# Patient Record
Sex: FEMALE | Race: ASIAN | NOT HISPANIC OR LATINO
[De-identification: names, ages, dates, MRNs, and addresses within clinical notes are randomized per-mention and may not be internally consistent; named-entity substitution may affect disease eponyms.]

---

## 2017-10-24 ENCOUNTER — IMAGING SERVICES (OUTPATIENT)
Dept: OTHER | Age: 47
End: 2017-10-24

## 2018-06-08 ENCOUNTER — CHARTING TRANS (OUTPATIENT)
Dept: OTHER | Age: 48
End: 2018-06-08

## 2018-06-08 ENCOUNTER — IMAGING SERVICES (OUTPATIENT)
Dept: OTHER | Age: 48
End: 2018-06-08

## 2018-06-08 ENCOUNTER — LAB SERVICES (OUTPATIENT)
Dept: OTHER | Age: 48
End: 2018-06-08

## 2018-06-11 ENCOUNTER — CHARTING TRANS (OUTPATIENT)
Dept: OTHER | Age: 48
End: 2018-06-11

## 2018-06-12 ENCOUNTER — CHARTING TRANS (OUTPATIENT)
Dept: OTHER | Age: 48
End: 2018-06-12

## 2018-06-13 LAB — AP REPORT: NORMAL

## 2018-06-14 ENCOUNTER — CHARTING TRANS (OUTPATIENT)
Dept: OTHER | Age: 48
End: 2018-06-14

## 2018-09-24 ENCOUNTER — CHARTING TRANS (OUTPATIENT)
Dept: OTHER | Age: 48
End: 2018-09-24

## 2018-10-09 ENCOUNTER — ANCILLARY ORDERS (OUTPATIENT)
Dept: OTHER | Age: 48
End: 2018-10-09

## 2018-10-09 ENCOUNTER — CHARTING TRANS (OUTPATIENT)
Dept: OTHER | Age: 48
End: 2018-10-09

## 2018-10-09 DIAGNOSIS — N64.4 MASTODYNIA: ICD-10-CM

## 2019-03-05 VITALS
DIASTOLIC BLOOD PRESSURE: 60 MMHG | HEIGHT: 60 IN | TEMPERATURE: 95.4 F | SYSTOLIC BLOOD PRESSURE: 90 MMHG | WEIGHT: 111 LBS | HEART RATE: 68 BPM | BODY MASS INDEX: 21.79 KG/M2

## 2019-06-28 ENCOUNTER — OFFICE VISIT (OUTPATIENT)
Dept: INTERNAL MEDICINE | Age: 49
End: 2019-06-28

## 2019-06-28 VITALS
HEART RATE: 68 BPM | SYSTOLIC BLOOD PRESSURE: 100 MMHG | RESPIRATION RATE: 12 BRPM | TEMPERATURE: 98.1 F | WEIGHT: 113 LBS | HEIGHT: 61 IN | DIASTOLIC BLOOD PRESSURE: 60 MMHG | BODY MASS INDEX: 21.34 KG/M2

## 2019-06-28 DIAGNOSIS — R73.9 HYPERGLYCEMIA: ICD-10-CM

## 2019-06-28 DIAGNOSIS — Z00.00 ROUTINE GENERAL MEDICAL EXAMINATION AT HEALTH CARE FACILITY: Primary | ICD-10-CM

## 2019-06-28 DIAGNOSIS — Z12.11 SCREENING FOR COLON CANCER: ICD-10-CM

## 2019-06-28 PROCEDURE — 99396 PREV VISIT EST AGE 40-64: CPT | Performed by: INTERNAL MEDICINE

## 2019-06-28 RX ORDER — LORATADINE 10 MG/1
TABLET ORAL
COMMUNITY

## 2019-06-28 RX ORDER — MULTIVITAMIN,THER AND MINERALS
TABLET ORAL
COMMUNITY

## 2019-06-28 SDOH — HEALTH STABILITY: MENTAL HEALTH
STRESS IS WHEN SOMEONE FEELS TENSE, NERVOUS, ANXIOUS, OR CAN'T SLEEP AT NIGHT BECAUSE THEIR MIND IS TROUBLED. HOW STRESSED ARE YOU?: NOT AT ALL

## 2019-06-28 SDOH — HEALTH STABILITY: PHYSICAL HEALTH: ON AVERAGE, HOW MANY MINUTES DO YOU ENGAGE IN EXERCISE AT THIS LEVEL?: 30 MIN

## 2019-06-28 SDOH — HEALTH STABILITY: PHYSICAL HEALTH: ON AVERAGE, HOW MANY DAYS PER WEEK DO YOU ENGAGE IN MODERATE TO STRENUOUS EXERCISE (LIKE A BRISK WALK)?: 4 DAYS

## 2019-06-28 ASSESSMENT — PATIENT HEALTH QUESTIONNAIRE - PHQ9
1. LITTLE INTEREST OR PLEASURE IN DOING THINGS: NOT AT ALL
SUM OF ALL RESPONSES TO PHQ9 QUESTIONS 1 AND 2: 0
SUM OF ALL RESPONSES TO PHQ9 QUESTIONS 1 AND 2: 0
2. FEELING DOWN, DEPRESSED OR HOPELESS: NOT AT ALL

## 2019-06-29 ENCOUNTER — LAB SERVICES (OUTPATIENT)
Dept: LAB | Age: 49
End: 2019-06-29

## 2019-06-29 DIAGNOSIS — Z00.00 ROUTINE GENERAL MEDICAL EXAMINATION AT HEALTH CARE FACILITY: ICD-10-CM

## 2019-06-29 DIAGNOSIS — R73.9 HYPERGLYCEMIA: ICD-10-CM

## 2019-06-29 LAB
ALBUMIN SERPL BCG-MCNC: 4.5 G/DL (ref 3.6–5.1)
ALP SERPL-CCNC: 52 U/L (ref 45–130)
ALT SERPL W/O P-5'-P-CCNC: 10 U/L (ref 7–34)
AST SERPL-CCNC: 16 U/L (ref 9–37)
BILIRUB SERPL-MCNC: 0.4 MG/DL (ref 0–1)
BUN SERPL-MCNC: 14 MG/DL (ref 7–20)
CALCIUM SERPL-MCNC: 9.3 MG/DL (ref 8.6–10.6)
CHLORIDE SERPL-SCNC: 105 MMOL/L (ref 96–107)
CHOLEST SERPL-MCNC: 134 MG/DL (ref 140–200)
CREAT SERPL-MCNC: 0.7 MG/DL (ref 0.5–1.4)
DIFFERENTIAL TYPE: ABNORMAL
EST. AVERAGE GLUCOSE BLD GHB EST-MCNC: 104 MG/DL (ref 0–154)
GFR SERPL CREATININE-BSD FRML MDRD: >60 ML/MIN/{1.73M2}
GFR SERPL CREATININE-BSD FRML MDRD: >60 ML/MIN/{1.73M2}
GLUCOSE P FAST SERPL-MCNC: 88 MG/DL (ref 60–100)
HBA1C MFR BLD: 5.3 % (ref 4.2–6)
HCO3 SERPL-SCNC: 28 MMOL/L (ref 22–32)
HDLC SERPL-MCNC: 68 MG/DL
HEMATOCRIT: 38.5 % (ref 34–45)
HEMOGLOBIN: 12.2 G/DL (ref 11.2–15.7)
LDLC SERPL CALC-MCNC: 53 MG/DL (ref 0–100)
LYMPH PERCENT: 35 % (ref 20.5–51.1)
LYMPHOCYTE ABSOLUTE #: 1.2 10*3/UL (ref 1.2–3.4)
MEAN CORPUSCULAR HGB CONCENTRATION: 31.7 % (ref 32–36)
MEAN CORPUSCULAR HGB: 28.2 PG (ref 27–34)
MEAN CORPUSCULAR VOLUME: 88.9 FL (ref 79–95)
MEAN PLATELET VOLUME: 10.2 FL (ref 8.6–12.4)
MIXED %: 7.2 % (ref 4.3–12.9)
MIXED ABSOLUTE #: 0.2 10*3/UL (ref 0.2–0.9)
NEUTROPHIL ABSOLUTE #: 2 10*3/UL (ref 1.4–6.5)
NEUTROPHIL PERCENT: 57.8 % (ref 34–73.5)
PLATELET COUNT: 180 10*3/UL (ref 150–400)
POTASSIUM SERPL-SCNC: 3.9 MMOL/L (ref 3.5–5.3)
PROT SERPL-MCNC: 6.9 G/DL (ref 6.2–8.1)
RED BLOOD CELL COUNT: 4.33 10*6/UL (ref 3.7–5.2)
RED CELL DISTRIBUTION WIDTH: 13.4 % (ref 11.3–14.8)
SODIUM SERPL-SCNC: 141 MMOL/L (ref 136–146)
TRIGL SERPL-MCNC: 69 MG/DL (ref 0–200)
TSH SERPL DL<=0.05 MIU/L-ACNC: 2.68 M[IU]/L (ref 0.3–4.82)
WHITE BLOOD CELL COUNT: 3.4 10*3/UL (ref 4–10)

## 2019-06-29 PROCEDURE — 36415 COLL VENOUS BLD VENIPUNCTURE: CPT | Performed by: INTERNAL MEDICINE

## 2019-06-29 PROCEDURE — 80061 LIPID PANEL: CPT | Performed by: INTERNAL MEDICINE

## 2019-06-29 PROCEDURE — 80050 GENERAL HEALTH PANEL: CPT | Performed by: INTERNAL MEDICINE

## 2019-06-29 PROCEDURE — 83036 HEMOGLOBIN GLYCOSYLATED A1C: CPT | Performed by: INTERNAL MEDICINE

## 2019-10-21 DIAGNOSIS — Z12.31 VISIT FOR SCREENING MAMMOGRAM: Primary | ICD-10-CM

## 2019-11-11 ENCOUNTER — IMAGING SERVICES (OUTPATIENT)
Dept: MAMMOGRAPHY | Age: 49
End: 2019-11-11
Attending: INTERNAL MEDICINE

## 2019-11-11 DIAGNOSIS — Z12.31 VISIT FOR SCREENING MAMMOGRAM: ICD-10-CM

## 2019-11-11 PROCEDURE — 77063 BREAST TOMOSYNTHESIS BI: CPT | Performed by: RADIOLOGY

## 2019-11-11 PROCEDURE — 77067 SCR MAMMO BI INCL CAD: CPT | Performed by: RADIOLOGY

## 2019-11-12 DIAGNOSIS — R92.30 DENSE BREAST TISSUE ON MAMMOGRAM: Primary | ICD-10-CM

## 2019-11-16 ENCOUNTER — TELEPHONE (OUTPATIENT)
Dept: ULTRASOUND IMAGING | Age: 49
End: 2019-11-16

## 2019-12-21 ENCOUNTER — TELEPHONE (OUTPATIENT)
Dept: INTERNAL MEDICINE | Age: 49
End: 2019-12-21

## 2020-10-06 ENCOUNTER — WALK IN (OUTPATIENT)
Dept: URGENT CARE | Age: 50
End: 2020-10-06

## 2020-10-06 DIAGNOSIS — H60.12 CELLULITIS OF LEFT EXTERNAL EAR: Primary | ICD-10-CM

## 2020-10-06 PROCEDURE — 99214 OFFICE O/P EST MOD 30 MIN: CPT | Performed by: FAMILY MEDICINE

## 2020-10-06 RX ORDER — PREDNISONE 20 MG/1
40 TABLET ORAL DAILY
Qty: 10 TABLET | Refills: 0 | Status: SHIPPED | OUTPATIENT
Start: 2020-10-06 | End: 2020-10-06 | Stop reason: SDUPTHER

## 2020-10-06 RX ORDER — CEFADROXIL 500 MG/1
500 CAPSULE ORAL 2 TIMES DAILY
Qty: 20 CAPSULE | Refills: 0 | Status: SHIPPED | OUTPATIENT
Start: 2020-10-06 | End: 2020-10-16

## 2020-10-06 RX ORDER — CEFADROXIL 500 MG/1
500 CAPSULE ORAL 2 TIMES DAILY
Qty: 20 CAPSULE | Refills: 0 | Status: SHIPPED | OUTPATIENT
Start: 2020-10-06 | End: 2020-10-06 | Stop reason: SDUPTHER

## 2020-10-06 RX ORDER — PREDNISONE 20 MG/1
40 TABLET ORAL DAILY
Qty: 10 TABLET | Refills: 0 | Status: SHIPPED | OUTPATIENT
Start: 2020-10-06 | End: 2020-10-11

## 2020-10-06 ASSESSMENT — PAIN SCALES - GENERAL: PAINLEVEL: 7-8

## 2021-02-10 ENCOUNTER — LAB SERVICES (OUTPATIENT)
Dept: LAB | Age: 51
End: 2021-02-10

## 2021-02-10 ENCOUNTER — OFFICE VISIT (OUTPATIENT)
Dept: INTERNAL MEDICINE | Age: 51
End: 2021-02-10

## 2021-02-10 DIAGNOSIS — Z91.018 FOOD ALLERGY: ICD-10-CM

## 2021-02-10 DIAGNOSIS — Z12.31 ENCOUNTER FOR SCREENING MAMMOGRAM FOR MALIGNANT NEOPLASM OF BREAST: ICD-10-CM

## 2021-02-10 DIAGNOSIS — H60.11 CELLULITIS OF RIGHT EXTERNAL EAR: Primary | ICD-10-CM

## 2021-02-10 PROCEDURE — 36415 COLL VENOUS BLD VENIPUNCTURE: CPT | Performed by: INTERNAL MEDICINE

## 2021-02-10 PROCEDURE — 86003 ALLG SPEC IGE CRUDE XTRC EA: CPT | Performed by: INTERNAL MEDICINE

## 2021-02-10 PROCEDURE — 99214 OFFICE O/P EST MOD 30 MIN: CPT | Performed by: INTERNAL MEDICINE

## 2021-02-10 RX ORDER — CEPHALEXIN 500 MG/1
500 CAPSULE ORAL 2 TIMES DAILY
Qty: 20 CAPSULE | Refills: 0 | Status: SHIPPED | OUTPATIENT
Start: 2021-02-10 | End: 2021-02-20

## 2021-02-10 ASSESSMENT — PATIENT HEALTH QUESTIONNAIRE - PHQ9
CLINICAL INTERPRETATION OF PHQ9 SCORE: NO FURTHER SCREENING NEEDED
2. FEELING DOWN, DEPRESSED OR HOPELESS: NOT AT ALL
SUM OF ALL RESPONSES TO PHQ9 QUESTIONS 1 AND 2: 0
CLINICAL INTERPRETATION OF PHQ2 SCORE: NO FURTHER SCREENING NEEDED
SUM OF ALL RESPONSES TO PHQ9 QUESTIONS 1 AND 2: 0
1. LITTLE INTEREST OR PLEASURE IN DOING THINGS: NOT AT ALL

## 2021-02-10 ASSESSMENT — PAIN SCALES - GENERAL: PAINLEVEL: 7-8

## 2021-02-14 ENCOUNTER — TELEPHONE (OUTPATIENT)
Dept: INTERNAL MEDICINE | Age: 51
End: 2021-02-14

## 2021-02-17 ENCOUNTER — TELEPHONE (OUTPATIENT)
Dept: INTERNAL MEDICINE | Age: 51
End: 2021-02-17

## 2021-02-19 DIAGNOSIS — J30.5 ALLERGIC RHINITIS DUE TO FOOD: Primary | ICD-10-CM

## 2021-02-19 LAB
ALMOND IGE QN: 0.81 KU/L (ref 0–0.1)
BEEF IGE QN: 6.34 KU/L (ref 0–0.1)
BRAZIL NUT IGE QN: 0.28 KU/L (ref 0–0.1)
CASHEW NUT IGE QN: 0.81 KU/L (ref 0–0.1)
CHICKEN MEAT IGE QN: 16.6 KU/L (ref 0–0.1)
CLAM IGE QN: 0.24 KU/L (ref 0–0.1)
CODFISH IGE QN: <0.1 KU/L (ref 0–0.1)
CORN IGE QN: 0.83 KU/L (ref 0–0.1)
CRAB IGE QN: 20.6 KU/L (ref 0–0.1)
EGG WHITE IGE QN: 0.18 KU/L (ref 0–0.1)
EGG YOLK IGE QN: 0.26 KU/L (ref 0–0.1)
HAZELNUT IGE QN: 18.7 KU/L (ref 0–0.1)
LOBSTER IGE QN: 9.15 KU/L (ref 0–0.1)
MILK IGE QN: 0.26 KU/L (ref 0–0.1)
PEANUT IGE QN: 2.07 KU/L (ref 0–0.1)
PECAN/HICK NUT IGE QN: <0.1 KU/L (ref 0–0.1)
PISTACHIO IGE QN: 1.71 KU/L (ref 0–0.1)
PORK IGE QN: 0.52 KU/L (ref 0–0.1)
RICE IGE QN: 0.51 KU/L (ref 0–0.1)
SALMON IGE QN: 0.26 KU/L (ref 0–0.1)
SCAD IGE QN: 0.13 KU/L (ref 0–0.1)
SCALLOP IGE QN: 0.2 KU/L (ref 0–0.1)
SHRIMP IGE QN: 17.3 KU/L (ref 0–0.1)
SOYBEAN IGE QN: 1.4 KU/L (ref 0–0.1)
TUNA IGE QN: 11.4 KU/L (ref 0–0.1)
WALNUT IGE QN: 0.67 KU/L (ref 0–0.1)
WHEAT IGE QN: 1.1 KU/L (ref 0–0.1)

## 2021-02-22 ENCOUNTER — TELEPHONE (OUTPATIENT)
Dept: INTERNAL MEDICINE | Age: 51
End: 2021-02-22

## 2021-02-25 ENCOUNTER — TELEPHONE (OUTPATIENT)
Dept: INTERNAL MEDICINE | Age: 51
End: 2021-02-25

## 2021-03-01 ENCOUNTER — APPOINTMENT (OUTPATIENT)
Dept: ALLERGY | Age: 51
End: 2021-03-01

## 2021-05-25 VITALS
SYSTOLIC BLOOD PRESSURE: 98 MMHG | SYSTOLIC BLOOD PRESSURE: 101 MMHG | RESPIRATION RATE: 16 BRPM | BODY MASS INDEX: 21.6 KG/M2 | WEIGHT: 110 LBS | HEART RATE: 63 BPM | TEMPERATURE: 97.5 F | TEMPERATURE: 98.2 F | DIASTOLIC BLOOD PRESSURE: 60 MMHG | DIASTOLIC BLOOD PRESSURE: 56 MMHG | HEIGHT: 60 IN | OXYGEN SATURATION: 99 % | HEART RATE: 67 BPM | OXYGEN SATURATION: 99 % | RESPIRATION RATE: 16 BRPM

## 2021-06-23 ENCOUNTER — OFFICE VISIT (OUTPATIENT)
Dept: FAMILY MEDICINE CLINIC | Facility: CLINIC | Age: 51
End: 2021-06-23
Payer: COMMERCIAL

## 2021-06-23 ENCOUNTER — LAB ENCOUNTER (OUTPATIENT)
Dept: LAB | Age: 51
End: 2021-06-23
Attending: PHYSICIAN ASSISTANT
Payer: COMMERCIAL

## 2021-06-23 VITALS
TEMPERATURE: 98 F | RESPIRATION RATE: 21 BRPM | HEIGHT: 61 IN | OXYGEN SATURATION: 97 % | WEIGHT: 109.19 LBS | BODY MASS INDEX: 20.62 KG/M2 | DIASTOLIC BLOOD PRESSURE: 70 MMHG | HEART RATE: 66 BPM | SYSTOLIC BLOOD PRESSURE: 108 MMHG

## 2021-06-23 DIAGNOSIS — M25.50 MULTIPLE JOINT PAIN: ICD-10-CM

## 2021-06-23 DIAGNOSIS — R01.1 SYSTOLIC MURMUR: ICD-10-CM

## 2021-06-23 DIAGNOSIS — Z12.31 VISIT FOR SCREENING MAMMOGRAM: ICD-10-CM

## 2021-06-23 DIAGNOSIS — Z88.9 MULTIPLE ALLERGIES: ICD-10-CM

## 2021-06-23 DIAGNOSIS — Z12.11 COLON CANCER SCREENING: ICD-10-CM

## 2021-06-23 DIAGNOSIS — M94.1 RELAPSING POLYCHONDRITIS: ICD-10-CM

## 2021-06-23 DIAGNOSIS — Z00.00 ROUTINE GENERAL MEDICAL EXAMINATION AT A HEALTH CARE FACILITY: Primary | ICD-10-CM

## 2021-06-23 PROCEDURE — 99386 PREV VISIT NEW AGE 40-64: CPT | Performed by: PHYSICIAN ASSISTANT

## 2021-06-23 PROCEDURE — 3008F BODY MASS INDEX DOCD: CPT | Performed by: PHYSICIAN ASSISTANT

## 2021-06-23 PROCEDURE — 3074F SYST BP LT 130 MM HG: CPT | Performed by: PHYSICIAN ASSISTANT

## 2021-06-23 PROCEDURE — 3078F DIAST BP <80 MM HG: CPT | Performed by: PHYSICIAN ASSISTANT

## 2021-06-23 RX ORDER — CHLORAL HYDRATE 500 MG
CAPSULE ORAL
COMMUNITY

## 2021-06-23 NOTE — PROGRESS NOTES
HPI/Subjective:   Patient ID: Carlota Smith is a 46year old female. HPI   Patient presents today to establish care and requests physical exam. Was previously receiving care at [facility] but her PCP retired.       PMHx: generally healthy  PSHx: none  FHx: revi Psychiatric/Behavioral: Negative for decreased concentration and sleep disturbance. The patient is not nervous/anxious. Current Outpatient Medications   Medication Sig Dispense Refill   • omega-3 fatty acids 1000 MG Oral Cap by Does not apply route. Musculoskeletal:         General: No tenderness. Normal range of motion. Cervical back: Normal range of motion and neck supple. Lymphadenopathy:      Cervical: No cervical adenopathy. Skin:     General: Skin is warm and dry.       Capillary Refil Relapsing polychondritis  Currently asymptomatic but we will check labs to assess for potential etiologies and follow-up pending results. - PERDITO, DIRECT, REFLEX TO 9 ENAS; Future  - RHEUMATOID ARTHRITIS FACTOR; Future  - SED RATE, LAINE (AUTOMATED);  Pasha Sánchez

## 2021-06-25 ENCOUNTER — LAB ENCOUNTER (OUTPATIENT)
Dept: LAB | Age: 51
End: 2021-06-25
Attending: PHYSICIAN ASSISTANT
Payer: COMMERCIAL

## 2021-06-25 DIAGNOSIS — Z00.00 ROUTINE GENERAL MEDICAL EXAMINATION AT A HEALTH CARE FACILITY: ICD-10-CM

## 2021-06-25 DIAGNOSIS — M94.1 RELAPSING POLYCHONDRITIS: ICD-10-CM

## 2021-06-25 DIAGNOSIS — Z88.9 MULTIPLE ALLERGIES: ICD-10-CM

## 2021-06-25 DIAGNOSIS — M25.50 MULTIPLE JOINT PAIN: ICD-10-CM

## 2021-06-25 PROCEDURE — 80050 GENERAL HEALTH PANEL: CPT | Performed by: PHYSICIAN ASSISTANT

## 2021-06-25 PROCEDURE — 82306 VITAMIN D 25 HYDROXY: CPT | Performed by: PHYSICIAN ASSISTANT

## 2021-06-25 PROCEDURE — 82607 VITAMIN B-12: CPT | Performed by: PHYSICIAN ASSISTANT

## 2021-06-25 PROCEDURE — 86225 DNA ANTIBODY NATIVE: CPT | Performed by: PHYSICIAN ASSISTANT

## 2021-06-25 PROCEDURE — 86235 NUCLEAR ANTIGEN ANTIBODY: CPT | Performed by: PHYSICIAN ASSISTANT

## 2021-06-25 PROCEDURE — 86003 ALLG SPEC IGE CRUDE XTRC EA: CPT | Performed by: PHYSICIAN ASSISTANT

## 2021-06-25 PROCEDURE — 86140 C-REACTIVE PROTEIN: CPT | Performed by: PHYSICIAN ASSISTANT

## 2021-06-25 PROCEDURE — 85652 RBC SED RATE AUTOMATED: CPT | Performed by: PHYSICIAN ASSISTANT

## 2021-06-25 PROCEDURE — 86431 RHEUMATOID FACTOR QUANT: CPT | Performed by: PHYSICIAN ASSISTANT

## 2021-06-25 PROCEDURE — 80061 LIPID PANEL: CPT | Performed by: PHYSICIAN ASSISTANT

## 2021-06-25 PROCEDURE — 86038 ANTINUCLEAR ANTIBODIES: CPT | Performed by: PHYSICIAN ASSISTANT

## 2021-06-25 PROCEDURE — 82785 ASSAY OF IGE: CPT | Performed by: PHYSICIAN ASSISTANT

## 2021-07-02 LAB
ANA SCREEN: POSITIVE
CENTROMERE AUTOAB: <100 AU/ML (ref ?–100)
DSDNA AUTOAB: 394 IU/ML (ref ?–100)
HISTONE AUTOAB: 272 AU/ML (ref ?–100)
JO-1 AUTOAB: <100 AU/ML (ref ?–100)
RNP AUTOAB: <100 AU/ML (ref ?–100)
SCL-70 AUTOAB: <100 AU/ML (ref ?–100)
SM AUTOAB (SMITH): <100 AU/ML (ref ?–100)
SSA AUTOAB: 424 AU/ML (ref ?–100)
SSB AUTOAB: 112 AU/ML (ref ?–100)

## 2021-07-23 ENCOUNTER — TELEPHONE (OUTPATIENT)
Dept: FAMILY MEDICINE CLINIC | Facility: CLINIC | Age: 51
End: 2021-07-23

## 2021-07-23 NOTE — TELEPHONE ENCOUNTER
Patient was seen at end of June and had labs done. She has not heard back on results. Informed patient that results and comments were sent to her MyChart. However she would like a nurse to give her a call. Please advise.

## 2021-07-23 NOTE — TELEPHONE ENCOUNTER
Thank you for bringing this to my attention. The PEDRITO is abnormal. There are elevated levels of SSA/SSB antibodies which are seen in Sjogren's disease. Also elevated histone and double-stranded DNA which are seen in lupus.   I recommend she follow-up with D

## 2021-07-23 NOTE — TELEPHONE ENCOUNTER
Patient notified, verbalized understanding, allergist phone number provided. Will mail hard copy of labs to patient home, address verified with patient. Patient has appt with Dr. Yuniel Bradford 9/2.

## 2021-08-18 ENCOUNTER — LAB ENCOUNTER (OUTPATIENT)
Dept: LAB | Facility: HOSPITAL | Age: 51
End: 2021-08-18
Payer: COMMERCIAL

## 2021-08-18 DIAGNOSIS — Z12.11 COLON CANCER SCREENING: ICD-10-CM

## 2021-08-18 PROCEDURE — 82274 ASSAY TEST FOR BLOOD FECAL: CPT

## 2021-08-24 LAB — HEMOCCULT STL QL: NEGATIVE

## 2021-09-02 ENCOUNTER — OFFICE VISIT (OUTPATIENT)
Dept: RHEUMATOLOGY | Facility: CLINIC | Age: 51
End: 2021-09-02
Payer: COMMERCIAL

## 2021-09-02 VITALS
HEART RATE: 66 BPM | SYSTOLIC BLOOD PRESSURE: 105 MMHG | WEIGHT: 107 LBS | OXYGEN SATURATION: 99 % | DIASTOLIC BLOOD PRESSURE: 60 MMHG | TEMPERATURE: 98 F | HEIGHT: 61 IN | BODY MASS INDEX: 20.2 KG/M2

## 2021-09-02 DIAGNOSIS — Z11.1 SCREENING FOR TUBERCULOSIS: ICD-10-CM

## 2021-09-02 DIAGNOSIS — Z11.52 ENCOUNTER FOR SCREENING FOR COVID-19: ICD-10-CM

## 2021-09-02 DIAGNOSIS — R76.8 POSITIVE ANA (ANTINUCLEAR ANTIBODY): ICD-10-CM

## 2021-09-02 DIAGNOSIS — Z11.59 NEED FOR HEPATITIS B SCREENING TEST: ICD-10-CM

## 2021-09-02 DIAGNOSIS — Z11.59 NEED FOR HEPATITIS C SCREENING TEST: ICD-10-CM

## 2021-09-02 DIAGNOSIS — M32.9 SYSTEMIC LUPUS ERYTHEMATOSUS, UNSPECIFIED SLE TYPE, UNSPECIFIED ORGAN INVOLVEMENT STATUS (HCC): Primary | ICD-10-CM

## 2021-09-02 DIAGNOSIS — Z51.81 THERAPEUTIC DRUG MONITORING: ICD-10-CM

## 2021-09-02 DIAGNOSIS — M94.1 RELAPSING POLYCHONDRITIS: ICD-10-CM

## 2021-09-02 PROCEDURE — 99245 OFF/OP CONSLTJ NEW/EST HI 55: CPT | Performed by: INTERNAL MEDICINE

## 2021-09-02 PROCEDURE — 3074F SYST BP LT 130 MM HG: CPT | Performed by: INTERNAL MEDICINE

## 2021-09-02 PROCEDURE — 3078F DIAST BP <80 MM HG: CPT | Performed by: INTERNAL MEDICINE

## 2021-09-02 PROCEDURE — 3008F BODY MASS INDEX DOCD: CPT | Performed by: INTERNAL MEDICINE

## 2021-09-02 RX ORDER — HYDROXYCHLOROQUINE SULFATE 200 MG/1
TABLET, FILM COATED ORAL
Qty: 135 TABLET | Refills: 1 | Status: SHIPPED | OUTPATIENT
Start: 2021-09-02

## 2021-09-02 RX ORDER — PREDNISONE 1 MG/1
TABLET ORAL
Qty: 28 TABLET | Refills: 0 | Status: SHIPPED | OUTPATIENT
Start: 2021-09-02 | End: 2021-09-23

## 2021-09-02 NOTE — PATIENT INSTRUCTIONS
Get bloodwork. Pulmonary function test. Call THE [facility] scheduling line to set the appointment up. Phone number is [phone].       Hydroxychloroquine (plaquenil) start: Take 1 tab daily for 2 weeks, and if tolerating, then increase to 1.5 tabs alta

## 2021-09-02 NOTE — PROGRESS NOTES
Rheumatology New Patient Note  =====================================================================================================      Date of visit: 9/2/2021  ?   Patient presents with:  Establish Care: New pt, referred by Vidya Woodson for abn labs Tab, Take 2 tablets (10 mg total) by mouth daily for 7 days, THEN 1 tablet (5 mg total) daily for 14 days. , Disp: 28 tablet, Rfl: 0  Hydroxychloroquine Sulfate 200 MG Oral Tab, Take 1 and 1/2 tabs each day, Disp: 135 tablet, Rfl: 1      Modified Medication erythema, or increased warmth. Elbows- No swelling, erythema, or increased warmth. Shoulders- FROM, abduction ~180 degrees bilaterally. Knees- No swelling, erythema, or increased warmth. AROM flexion/extension ~0-180 degrees.     No valgus/varus laxi [id number], [id number]  No results found for: CARDIOLIPIGG, CARDIOLIPIGM, CARDIOLIPIGA, CARDIOIGA, CARLIP      Additional Labs:  2021 labs  PEDRITO positive    SSB: 112  WBC 2.8, hemoglobin 12.2, platelets 642  Vitamin D 34.2    Radiology:    4/2021 MRI scleritis/keratitis/uveitis. Today the patient is feeling well, perhaps some residual pinna inflammation and some mild intermittent tinnitus. After changing diet to more plant-based, patient is feeling much better.   Extensive discussion regarding natur DIFFERENTIAL WITH PLATELET; Future  -     COMP METABOLIC PANEL (14); Future  -     COMPLEMENT C3, SERUM; Future  -     COMPLEMENT C4, SERUM; Future  -     C-REACTIVE PROTEIN; Future  -     SED RATE, WESTERGREN (AUTOMATED);  Future  -     URINALYSIS WITH CUL daily for 7 days, THEN 1 tablet (5 mg total) daily for 14 days. Need for hepatitis B screening test  -     HEPATITIS B SURFACE ANTIGEN; Future  -     HEP B CORE AB, TOT; Future  -     HEPATITIS B SURFACE ANTIBODY;  Future    Need for hepatitis C screenin plan of care were answered. Thank you for referring this delightful patient to me. Please feel free to contact me with any questions. This report was performed utilizing speech recognition software technology.  Despite proofreading, speech recognitio

## 2021-11-01 ENCOUNTER — LAB ENCOUNTER (OUTPATIENT)
Dept: LAB | Age: 51
End: 2021-11-01
Attending: INTERNAL MEDICINE
Payer: COMMERCIAL

## 2021-11-01 DIAGNOSIS — R76.8 POSITIVE ANA (ANTINUCLEAR ANTIBODY): ICD-10-CM

## 2021-11-01 DIAGNOSIS — Z11.59 NEED FOR HEPATITIS C SCREENING TEST: ICD-10-CM

## 2021-11-01 DIAGNOSIS — Z51.81 THERAPEUTIC DRUG MONITORING: ICD-10-CM

## 2021-11-01 DIAGNOSIS — M32.9 SYSTEMIC LUPUS ERYTHEMATOSUS, UNSPECIFIED SLE TYPE, UNSPECIFIED ORGAN INVOLVEMENT STATUS (HCC): ICD-10-CM

## 2021-11-01 DIAGNOSIS — Z11.59 NEED FOR HEPATITIS B SCREENING TEST: ICD-10-CM

## 2021-11-01 PROCEDURE — 83883 ASSAY NEPHELOMETRY NOT SPEC: CPT | Performed by: INTERNAL MEDICINE

## 2021-11-01 PROCEDURE — 86146 BETA-2 GLYCOPROTEIN ANTIBODY: CPT | Performed by: INTERNAL MEDICINE

## 2021-11-01 PROCEDURE — 86706 HEP B SURFACE ANTIBODY: CPT | Performed by: INTERNAL MEDICINE

## 2021-11-01 PROCEDURE — 87340 HEPATITIS B SURFACE AG IA: CPT | Performed by: INTERNAL MEDICINE

## 2021-11-01 PROCEDURE — 86147 CARDIOLIPIN ANTIBODY EA IG: CPT | Performed by: INTERNAL MEDICINE

## 2021-11-01 PROCEDURE — 83516 IMMUNOASSAY NONANTIBODY: CPT | Performed by: INTERNAL MEDICINE

## 2021-11-01 PROCEDURE — 86803 HEPATITIS C AB TEST: CPT | Performed by: INTERNAL MEDICINE

## 2021-11-01 PROCEDURE — 85732 THROMBOPLASTIN TIME PARTIAL: CPT | Performed by: INTERNAL MEDICINE

## 2021-11-01 PROCEDURE — 84156 ASSAY OF PROTEIN URINE: CPT | Performed by: INTERNAL MEDICINE

## 2021-11-01 PROCEDURE — 86480 TB TEST CELL IMMUN MEASURE: CPT | Performed by: INTERNAL MEDICINE

## 2021-11-01 PROCEDURE — 86704 HEP B CORE ANTIBODY TOTAL: CPT | Performed by: INTERNAL MEDICINE

## 2021-11-01 PROCEDURE — 85705 THROMBOPLASTIN INHIBITION: CPT | Performed by: INTERNAL MEDICINE

## 2021-11-01 PROCEDURE — 83876 ASSAY MYELOPEROXIDASE: CPT | Performed by: INTERNAL MEDICINE

## 2021-11-01 PROCEDURE — 85652 RBC SED RATE AUTOMATED: CPT | Performed by: INTERNAL MEDICINE

## 2021-11-01 PROCEDURE — 86255 FLUORESCENT ANTIBODY SCREEN: CPT | Performed by: INTERNAL MEDICINE

## 2021-11-01 PROCEDURE — 85610 PROTHROMBIN TIME: CPT | Performed by: INTERNAL MEDICINE

## 2021-11-01 PROCEDURE — 84165 PROTEIN E-PHORESIS SERUM: CPT | Performed by: INTERNAL MEDICINE

## 2021-11-01 PROCEDURE — 86200 CCP ANTIBODY: CPT | Performed by: INTERNAL MEDICINE

## 2021-11-01 PROCEDURE — 86160 COMPLEMENT ANTIGEN: CPT | Performed by: INTERNAL MEDICINE

## 2021-11-01 PROCEDURE — 85025 COMPLETE CBC W/AUTO DIFF WBC: CPT | Performed by: INTERNAL MEDICINE

## 2021-11-01 PROCEDURE — 86334 IMMUNOFIX E-PHORESIS SERUM: CPT | Performed by: INTERNAL MEDICINE

## 2021-11-01 PROCEDURE — 80053 COMPREHEN METABOLIC PANEL: CPT | Performed by: INTERNAL MEDICINE

## 2021-11-01 PROCEDURE — 86140 C-REACTIVE PROTEIN: CPT | Performed by: INTERNAL MEDICINE

## 2021-11-01 PROCEDURE — 82570 ASSAY OF URINE CREATININE: CPT | Performed by: INTERNAL MEDICINE

## 2021-11-01 PROCEDURE — 87086 URINE CULTURE/COLONY COUNT: CPT | Performed by: INTERNAL MEDICINE

## 2021-11-01 PROCEDURE — 81001 URINALYSIS AUTO W/SCOPE: CPT | Performed by: INTERNAL MEDICINE

## 2021-11-01 PROCEDURE — 86431 RHEUMATOID FACTOR QUANT: CPT | Performed by: INTERNAL MEDICINE

## 2021-11-01 PROCEDURE — 86038 ANTINUCLEAR ANTIBODIES: CPT | Performed by: INTERNAL MEDICINE

## 2021-11-01 PROCEDURE — 85613 RUSSELL VIPER VENOM DILUTED: CPT | Performed by: INTERNAL MEDICINE

## 2021-11-04 ENCOUNTER — TELEPHONE (OUTPATIENT)
Dept: RHEUMATOLOGY | Facility: CLINIC | Age: 51
End: 2021-11-04

## 2021-11-04 ENCOUNTER — OFFICE VISIT (OUTPATIENT)
Dept: RHEUMATOLOGY | Facility: CLINIC | Age: 51
End: 2021-11-04
Payer: COMMERCIAL

## 2021-11-04 VITALS
OXYGEN SATURATION: 99 % | HEART RATE: 79 BPM | BODY MASS INDEX: 21.4 KG/M2 | HEIGHT: 60 IN | WEIGHT: 109 LBS | DIASTOLIC BLOOD PRESSURE: 72 MMHG | SYSTOLIC BLOOD PRESSURE: 106 MMHG

## 2021-11-04 DIAGNOSIS — M94.1 RELAPSING POLYCHONDRITIS: ICD-10-CM

## 2021-11-04 DIAGNOSIS — R06.02 SOB (SHORTNESS OF BREATH): ICD-10-CM

## 2021-11-04 DIAGNOSIS — R76.8 ABNORMAL ANCA TEST: ICD-10-CM

## 2021-11-04 DIAGNOSIS — M32.9 SYSTEMIC LUPUS ERYTHEMATOSUS, UNSPECIFIED SLE TYPE, UNSPECIFIED ORGAN INVOLVEMENT STATUS (HCC): Primary | ICD-10-CM

## 2021-11-04 PROCEDURE — 3008F BODY MASS INDEX DOCD: CPT | Performed by: INTERNAL MEDICINE

## 2021-11-04 PROCEDURE — 3078F DIAST BP <80 MM HG: CPT | Performed by: INTERNAL MEDICINE

## 2021-11-04 PROCEDURE — 3074F SYST BP LT 130 MM HG: CPT | Performed by: INTERNAL MEDICINE

## 2021-11-04 PROCEDURE — 99215 OFFICE O/P EST HI 40 MIN: CPT | Performed by: INTERNAL MEDICINE

## 2021-11-04 NOTE — PATIENT INSTRUCTIONS
Hydroxychloroquine (plaquenil) start: Take 1 tab daily. A list of medications or exposures that can flare lupus. :    -UV light  -Bactrim or other sulfa medications,  -Echinacea  -Alfalfa sprouts  -CoQ-10  -Melatonin  -Garlic    ====================== pesticides and chemical contaminants and are better if it fit your budget  If not make sure you wash produce really well before eating or cooking   Avoid using plastic utensils to store wet food or heat food in microwave

## 2021-11-04 NOTE — TELEPHONE ENCOUNTER
Called to initiate PA for imaging; CT Chest Hi Resolution; rerouted to [facility] [phone] ([facility]):  PA required; clinicals answered during this call; [facility] #[id number], Effective 11/4/21 - 12/3/21.     Called pt to update; lm on

## 2021-11-04 NOTE — TELEPHONE ENCOUNTER
Pt returned my call. Updated on PA approval for CT. Is driving currently but will call if she does not have CS phone #.

## 2021-11-04 NOTE — PROGRESS NOTES
Rheumatology f/u Patient Note  =====================================================================================================    ? Patient presents with: Follow - Up: 2mo f/u, feeling better since last visit.  Did not start hydroxychloroquine d clots.    ==============================================================================================================  Today's Visit: 11/04/21    Feeling much better after prednisone taper over 3 weeks. Stiffness in hands much better.  Talked to sister Strength, CN2-12 grossly intact   Psych: normal affect. Skin: No lesions or rashes. MSK: 28 joint count performed. No evidence of synovitis in mcp, pip, dip, wrist, elbows, shoulders, hips, knees, ankles, mtp unless otherwise noted.  Full ROM of elbows, 11/01/2021    [id number] <2.9 11/01/2021     Lab Results   Component Value Date    CARDIOLIPIGG 4.2 11/01/2021    CARDIOLIPIGM 4.1 11/01/2021         Additional Labs:    11/2021  C3 71.4, C4 19  CRP less than 0.29  Sed rate 39   lupus anticoagulant, antica well.  She took low-dose prednisone taper (10 mg -> 5 mg over 3 weeks) since last clinic visit which improved her hand arthralgia and mild residual pinna inflammation. After changing diet to more plant-based.     There still remains some mild inflammatory COMP METABOLIC PANEL (14); Future  -     COMPLEMENT C3, SERUM; Future  -     COMPLEMENT C4, SERUM; Future  -     CBC WITH DIFFERENTIAL WITH PLATELET; Future  -     SED RATE, WESTERGREN (AUTOMATED);  Future  -     C-REACTIVE PROTEIN; Future  -     URINALYSI

## 2021-11-05 NOTE — PROGRESS NOTES
Dear Noel Ling    Another test is positive that can be seen related to lupus or certain other inflammation conditions. It will be important to get the CT scan to make sure there is no inflammation in the lungs.      I hope you are well,    Art Brewster MD

## 2021-11-16 ENCOUNTER — PATIENT MESSAGE (OUTPATIENT)
Dept: FAMILY MEDICINE CLINIC | Facility: CLINIC | Age: 51
End: 2021-11-16

## 2021-11-16 NOTE — TELEPHONE ENCOUNTER
From: Stanley Pendleton  To: Jonathan Azul PA-C  Sent: 11/16/2021 10:54 AM CST  Subject: COVID booster shot    I have Swati Azael and am looking to get booster shot, where can I get it?   Thanks,   Pedro Hagen

## 2021-11-19 ENCOUNTER — HOSPITAL ENCOUNTER (OUTPATIENT)
Dept: CT IMAGING | Age: 51
Discharge: HOME | End: 2021-11-19
Attending: INTERNAL MEDICINE
Payer: COMMERCIAL

## 2021-11-19 DIAGNOSIS — R76.8 ABNORMAL ANCA TEST: ICD-10-CM

## 2021-11-19 DIAGNOSIS — R06.02 SOB (SHORTNESS OF BREATH): ICD-10-CM

## 2021-11-19 PROCEDURE — 71250 CT THORAX DX C-: CPT | Performed by: INTERNAL MEDICINE

## 2021-11-19 NOTE — PROGRESS NOTES
Can you call radiology for Dr. Sis Judge to comment on if there is any tracheitis or tracheal stenosis. Patient with history of relapsing polychondritis and want to rule this out.

## 2021-11-20 NOTE — PROGRESS NOTES
Dear Becca Loveless news, no evidence of lung inflammation. The following findings are not significant and are likely scars related to a prior mold or viral or bacterial pneumonia.      2. There is mild bilateral apical pleural and parenchymal scarr

## 2021-11-23 NOTE — PROGRESS NOTES
ADDENDUM:  There is no CT evidence for tracheitis or tracheal stenosis. This is good news. No inflammation in trachea.

## 2021-11-26 ENCOUNTER — HOSPITAL ENCOUNTER (OUTPATIENT)
Dept: MAMMOGRAPHY | Age: 51
Discharge: HOME | End: 2021-11-26
Attending: PHYSICIAN ASSISTANT
Payer: COMMERCIAL

## 2021-11-26 DIAGNOSIS — Z12.31 VISIT FOR SCREENING MAMMOGRAM: ICD-10-CM

## 2021-12-29 ENCOUNTER — HOSPITAL ENCOUNTER (OUTPATIENT)
Dept: MAMMOGRAPHY | Age: 51
Discharge: HOME | End: 2021-12-29
Attending: PHYSICIAN ASSISTANT
Payer: COMMERCIAL

## 2021-12-29 PROCEDURE — 77063 BREAST TOMOSYNTHESIS BI: CPT | Performed by: PHYSICIAN ASSISTANT

## 2021-12-29 PROCEDURE — 77067 SCR MAMMO BI INCL CAD: CPT | Performed by: PHYSICIAN ASSISTANT

## 2022-08-30 ENCOUNTER — TELEPHONE (OUTPATIENT)
Dept: CASE MANAGEMENT | Age: 52
End: 2022-08-30

## 2023-05-11 ENCOUNTER — OFFICE VISIT (OUTPATIENT)
Dept: FAMILY MEDICINE CLINIC | Facility: CLINIC | Age: 53
End: 2023-05-11
Payer: COMMERCIAL

## 2023-05-11 VITALS
RESPIRATION RATE: 18 BRPM | BODY MASS INDEX: 21.2 KG/M2 | OXYGEN SATURATION: 98 % | TEMPERATURE: 98 F | HEIGHT: 60 IN | HEART RATE: 76 BPM | WEIGHT: 108 LBS

## 2023-05-11 DIAGNOSIS — Z48.02 VISIT FOR SUTURE REMOVAL: Primary | ICD-10-CM

## 2023-05-11 PROCEDURE — 99215 OFFICE O/P EST HI 40 MIN: CPT | Performed by: PHYSICIAN ASSISTANT

## 2023-05-11 PROCEDURE — 3008F BODY MASS INDEX DOCD: CPT | Performed by: PHYSICIAN ASSISTANT

## 2023-06-18 ENCOUNTER — OFFICE VISIT (OUTPATIENT)
Dept: FAMILY MEDICINE CLINIC | Facility: CLINIC | Age: 53
End: 2023-06-18
Payer: COMMERCIAL

## 2023-06-18 VITALS
RESPIRATION RATE: 18 BRPM | BODY MASS INDEX: 20.62 KG/M2 | SYSTOLIC BLOOD PRESSURE: 114 MMHG | DIASTOLIC BLOOD PRESSURE: 66 MMHG | HEART RATE: 76 BPM | WEIGHT: 105 LBS | OXYGEN SATURATION: 99 % | TEMPERATURE: 98 F | HEIGHT: 60 IN

## 2023-06-18 DIAGNOSIS — B02.9 HERPES ZOSTER WITHOUT COMPLICATION: Primary | ICD-10-CM

## 2023-06-18 PROCEDURE — 3078F DIAST BP <80 MM HG: CPT | Performed by: NURSE PRACTITIONER

## 2023-06-18 PROCEDURE — 3008F BODY MASS INDEX DOCD: CPT | Performed by: NURSE PRACTITIONER

## 2023-06-18 PROCEDURE — 3074F SYST BP LT 130 MM HG: CPT | Performed by: NURSE PRACTITIONER

## 2023-06-18 PROCEDURE — 99213 OFFICE O/P EST LOW 20 MIN: CPT | Performed by: NURSE PRACTITIONER

## 2023-06-18 RX ORDER — VALACYCLOVIR HYDROCHLORIDE 1 G/1
1000 TABLET, FILM COATED ORAL 3 TIMES DAILY
Qty: 30 TABLET | Refills: 0 | Status: SHIPPED | OUTPATIENT
Start: 2023-06-18 | End: 2023-06-28

## 2023-06-18 NOTE — PATIENT INSTRUCTIONS
Take all of your medicines as instructed. Keep your rash clean and dry. Do not use creams or gels unless your doctor or nurse says that you should. Try not to scratch your skin. It might help to cover it with a clean dressing. Wear loose clothing if this makes you more comfortable.

## 2023-07-12 ENCOUNTER — OFFICE VISIT (OUTPATIENT)
Dept: FAMILY MEDICINE CLINIC | Facility: CLINIC | Age: 53
End: 2023-07-12
Payer: COMMERCIAL

## 2023-07-12 VITALS
BODY MASS INDEX: 21.01 KG/M2 | WEIGHT: 107 LBS | SYSTOLIC BLOOD PRESSURE: 112 MMHG | HEIGHT: 60 IN | HEART RATE: 78 BPM | RESPIRATION RATE: 18 BRPM | DIASTOLIC BLOOD PRESSURE: 68 MMHG | OXYGEN SATURATION: 99 % | TEMPERATURE: 98 F

## 2023-07-12 DIAGNOSIS — M32.9 SYSTEMIC LUPUS ERYTHEMATOSUS, UNSPECIFIED SLE TYPE, UNSPECIFIED ORGAN INVOLVEMENT STATUS (HCC): ICD-10-CM

## 2023-07-12 DIAGNOSIS — Z12.31 VISIT FOR SCREENING MAMMOGRAM: ICD-10-CM

## 2023-07-12 DIAGNOSIS — Z12.4 SCREENING FOR CERVICAL CANCER: ICD-10-CM

## 2023-07-12 DIAGNOSIS — Z13.820 ENCOUNTER FOR OSTEOPOROSIS SCREENING IN ASYMPTOMATIC POSTMENOPAUSAL PATIENT: ICD-10-CM

## 2023-07-12 DIAGNOSIS — Z78.0 ENCOUNTER FOR OSTEOPOROSIS SCREENING IN ASYMPTOMATIC POSTMENOPAUSAL PATIENT: ICD-10-CM

## 2023-07-12 DIAGNOSIS — Z00.00 ROUTINE GENERAL MEDICAL EXAMINATION AT A HEALTH CARE FACILITY: Primary | ICD-10-CM

## 2023-07-12 DIAGNOSIS — Z12.11 COLON CANCER SCREENING: ICD-10-CM

## 2023-07-12 PROBLEM — H04.123 DRY EYE SYNDROME OF BILATERAL LACRIMAL GLANDS: Status: ACTIVE | Noted: 2021-07-03

## 2023-07-12 PROCEDURE — 87624 HPV HI-RISK TYP POOLED RSLT: CPT | Performed by: PHYSICIAN ASSISTANT

## 2023-07-12 PROCEDURE — 3074F SYST BP LT 130 MM HG: CPT | Performed by: PHYSICIAN ASSISTANT

## 2023-07-12 PROCEDURE — 99396 PREV VISIT EST AGE 40-64: CPT | Performed by: PHYSICIAN ASSISTANT

## 2023-07-12 PROCEDURE — 3078F DIAST BP <80 MM HG: CPT | Performed by: PHYSICIAN ASSISTANT

## 2023-07-12 PROCEDURE — 3008F BODY MASS INDEX DOCD: CPT | Performed by: PHYSICIAN ASSISTANT

## 2023-07-12 NOTE — PROGRESS NOTES
Subjective:   Patient ID: Janessa Kohler is a 52year old female. HPI  Patient presents today for physical.      Diet: well balanced  Exercise: regular activity  Tobacco use: denies  Drug use: denies  Alcohol use: denies  Sexually active: with spouse  LMP: postmenopausal   Marital status: , 2 children  Occupation: analyst     Health maintenance:  Pap/Hpv: 9/23/22 negative  Mammogram: 12/29/21 benign  Performs SBEs: yes  Dexa: never  Colonoscopy: never   Discussed age appropriate vaccines    Still having some nerve pain from shingles      History/Other:   Review of Systems   Constitutional:  Negative for chills, fatigue and fever. HENT:  Negative for congestion, ear pain, rhinorrhea and sore throat. Eyes:  Negative for visual disturbance. Respiratory:  Negative for cough, shortness of breath and wheezing. Cardiovascular:  Negative for chest pain, palpitations and leg swelling. Gastrointestinal:  Negative for abdominal pain, diarrhea, nausea and vomiting. Genitourinary:  Negative for dysuria, frequency and hematuria. Musculoskeletal:  Negative for arthralgias, gait problem and myalgias. Skin:  Negative for rash. Neurological:  Positive for numbness (nerve pain associated with shingles). Negative for weakness, light-headedness and headaches. Hematological:  Negative for adenopathy. Psychiatric/Behavioral:  The patient is not nervous/anxious. No current outpatient medications on file. Allergies:Not on File    Objective:   Physical Exam  Vitals and nursing note reviewed. Constitutional:       Appearance: She is well-developed. HENT:      Head: Normocephalic and atraumatic. Right Ear: Tympanic membrane and external ear normal.      Left Ear: Tympanic membrane and external ear normal.      Nose: Nose normal.   Eyes:      Conjunctiva/sclera: Conjunctivae normal.      Pupils: Pupils are equal, round, and reactive to light. Neck:      Thyroid: No thyromegaly.    Cardiovascular: Rate and Rhythm: Normal rate and regular rhythm. Heart sounds: Normal heart sounds. Pulmonary:      Effort: Pulmonary effort is normal.      Breath sounds: Normal breath sounds. No wheezing or rales. Chest:   Breasts:     Breasts are symmetrical.      Right: No swelling, bleeding, inverted nipple, mass, nipple discharge, skin change or tenderness. Left: No swelling, bleeding, inverted nipple, mass, nipple discharge, skin change or tenderness. Abdominal:      General: Bowel sounds are normal. There is no distension. Palpations: Abdomen is soft. There is no mass. Tenderness: There is no abdominal tenderness. There is no guarding or rebound. Genitourinary:     Vagina: Normal.      Cervix: Normal.      Adnexa: Right adnexa normal and left adnexa normal.        Right: No mass, tenderness or fullness. Left: No mass, tenderness or fullness. Musculoskeletal:         General: No tenderness. Normal range of motion. Cervical back: Normal range of motion and neck supple. Lymphadenopathy:      Cervical: No cervical adenopathy. Skin:     General: Skin is warm and dry. Neurological:      Mental Status: She is alert and oriented to person, place, and time. Cranial Nerves: No cranial nerve deficit. Psychiatric:         Behavior: Behavior normal.         Assessment & Plan:   1. Routine general medical examination at a health care facility  Patient is generally healthy. Physical exam is unremarkable. Check fasting labs. Health maintenance issues discussed. Encouraged routine vaccines. Advised healthy diet and regular exercise. Annual physicals. - CBC WITH DIFFERENTIAL WITH PLATELET; Future  - COMP METABOLIC PANEL (14); Future  - LIPID PANEL; Future  - TSH W REFLEX TO FREE T4; Future    2. Screening for cervical cancer  - THINPREP PAP SMEAR B; Future  - HPV HIGH RISK , THIN PREP COLLECTION; Future  - THINPREP PAP SMEAR B  - HPV HIGH RISK , THIN PREP COLLECTION    3. Visit for screening mammogram  - [facility] SOPHIE 2D+3D SCREENING BILAT (CPT=77067/44462); Future    4. Encounter for osteoporosis screening in asymptomatic postmenopausal patient  - XR DEXA BONE DENSITOMETRY (CPT=77080); Future    5. Colon cancer screening  - GASTRO - INTERNAL    6. Systemic lupus erythematosus, unspecified SLE type, unspecified [facility]  Doing okay off medications. Recheck labs. Refer back to rheum if needed. - SED RATE, WESTERGREN (AUTOMATED); Future  - C-REACTIVE PROTEIN; Future  - CONNECTIVE TISSUE DISEASE (PEDRITO) SCREEN, REFLEX SPECIFIC ANTIBODY;  Future

## 2023-07-13 LAB — HPV I/H RISK 1 DNA SPEC QL NAA+PROBE: NEGATIVE

## 2023-09-15 PROBLEM — Z12.11 SPECIAL SCREENING FOR MALIGNANT NEOPLASMS, COLON: Status: ACTIVE | Noted: 2023-09-15

## 2023-10-31 ENCOUNTER — HOSPITAL ENCOUNTER (OUTPATIENT)
Dept: BONE DENSITY | Age: 53
Discharge: HOME | End: 2023-10-31
Attending: PHYSICIAN ASSISTANT
Payer: COMMERCIAL

## 2023-10-31 ENCOUNTER — HOSPITAL ENCOUNTER (OUTPATIENT)
Dept: MAMMOGRAPHY | Age: 53
Discharge: HOME | End: 2023-10-31
Attending: PHYSICIAN ASSISTANT
Payer: COMMERCIAL

## 2023-10-31 DIAGNOSIS — Z78.0 ENCOUNTER FOR OSTEOPOROSIS SCREENING IN ASYMPTOMATIC POSTMENOPAUSAL PATIENT: ICD-10-CM

## 2023-10-31 DIAGNOSIS — Z12.31 VISIT FOR SCREENING MAMMOGRAM: ICD-10-CM

## 2023-10-31 DIAGNOSIS — Z13.820 ENCOUNTER FOR OSTEOPOROSIS SCREENING IN ASYMPTOMATIC POSTMENOPAUSAL PATIENT: ICD-10-CM

## 2023-10-31 PROCEDURE — 77080 DXA BONE DENSITY AXIAL: CPT | Performed by: PHYSICIAN ASSISTANT

## 2023-10-31 PROCEDURE — 77067 SCR MAMMO BI INCL CAD: CPT | Performed by: PHYSICIAN ASSISTANT

## 2023-10-31 PROCEDURE — 77063 BREAST TOMOSYNTHESIS BI: CPT | Performed by: PHYSICIAN ASSISTANT

## 2023-11-15 ENCOUNTER — HOSPITAL ENCOUNTER (OUTPATIENT)
Age: 53
Discharge: HOME | End: 2023-11-15
Attending: EMERGENCY MEDICINE
Payer: COMMERCIAL

## 2023-11-15 VITALS
TEMPERATURE: 99 F | OXYGEN SATURATION: 100 % | BODY MASS INDEX: 20.62 KG/M2 | HEIGHT: 60 IN | DIASTOLIC BLOOD PRESSURE: 54 MMHG | HEART RATE: 91 BPM | SYSTOLIC BLOOD PRESSURE: 107 MMHG | RESPIRATION RATE: 20 BRPM | WEIGHT: 105 LBS

## 2023-11-15 DIAGNOSIS — K29.00 ACUTE GASTRITIS WITHOUT HEMORRHAGE, UNSPECIFIED GASTRITIS TYPE: Primary | ICD-10-CM

## 2023-11-15 LAB
BILIRUB UR QL STRIP: NEGATIVE
CLARITY UR: CLEAR
COLOR UR: YELLOW
GLUCOSE UR STRIP-MCNC: NEGATIVE MG/DL
HGB UR QL STRIP: NEGATIVE
KETONES UR STRIP-MCNC: NEGATIVE MG/DL
LEUKOCYTE ESTERASE UR QL STRIP: NEGATIVE
NITRITE UR QL STRIP: NEGATIVE
PH UR STRIP: 5.5 [PH]
PROT UR STRIP-MCNC: NEGATIVE MG/DL
SP GR UR STRIP: <=1.005
UROBILINOGEN UR STRIP-ACNC: <2 MG/DL

## 2023-11-15 PROCEDURE — 81002 URINALYSIS NONAUTO W/O SCOPE: CPT

## 2023-11-15 PROCEDURE — 99212 OFFICE O/P EST SF 10 MIN: CPT

## 2023-11-15 PROCEDURE — 99203 OFFICE O/P NEW LOW 30 MIN: CPT

## 2023-11-15 RX ORDER — PANTOPRAZOLE SODIUM 20 MG/1
20 TABLET, DELAYED RELEASE ORAL DAILY
Qty: 30 TABLET | Refills: 0 | Status: SHIPPED | OUTPATIENT
Start: 2023-11-15 | End: 2023-11-22

## 2023-11-15 NOTE — ED INITIAL ASSESSMENT (HPI)
Patient reports she had diarrhea 2 weeks ago. Patient reports since then she has had diarrhea after having oatmeal and discomfort after eating.

## 2023-11-19 ENCOUNTER — HOSPITAL ENCOUNTER (OUTPATIENT)
Age: 53
Discharge: HOME | End: 2023-11-19
Payer: COMMERCIAL

## 2023-11-19 ENCOUNTER — APPOINTMENT (OUTPATIENT)
Dept: GENERAL RADIOLOGY | Age: 53
End: 2023-11-19
Attending: PHYSICIAN ASSISTANT
Payer: COMMERCIAL

## 2023-11-19 VITALS
DIASTOLIC BLOOD PRESSURE: 57 MMHG | OXYGEN SATURATION: 97 % | RESPIRATION RATE: 14 BRPM | HEART RATE: 96 BPM | SYSTOLIC BLOOD PRESSURE: 84 MMHG | TEMPERATURE: 100 F

## 2023-11-19 DIAGNOSIS — K04.7 DENTAL INFECTION: ICD-10-CM

## 2023-11-19 DIAGNOSIS — R53.1 WEAKNESS: ICD-10-CM

## 2023-11-19 DIAGNOSIS — R07.9 CHEST PAIN OF UNCERTAIN ETIOLOGY: Primary | ICD-10-CM

## 2023-11-19 DIAGNOSIS — E86.0 DEHYDRATION: ICD-10-CM

## 2023-11-19 LAB
#MXD IC: 0.1 X10ˆ3/UL (ref 0.1–1)
ATRIAL RATE: 79 BPM
BUN BLD-MCNC: 5 MG/DL (ref 7–18)
CHLORIDE BLD-SCNC: 96 MMOL/L (ref 98–112)
CO2 BLD-SCNC: 24 MMOL/L (ref 21–32)
CREAT BLD-MCNC: 0.5 MG/DL
EGFRCR SERPLBLD CKD-EPI 2021: 111 ML/MIN/1.73M2 (ref 60–?)
GLUCOSE BLD-MCNC: 97 MG/DL (ref 70–99)
HCT VFR BLD AUTO: 33.6 %
HCT VFR BLD CALC: 34 %
HGB BLD-MCNC: 11 G/DL
ISTAT IONIZED CALCIUM FOR CHEM 8: 1.1 MMOL/L (ref 1.12–1.32)
LYMPHOCYTES # BLD AUTO: 0.5 X10ˆ3/UL (ref 1–4)
LYMPHOCYTES NFR BLD AUTO: 34.3 %
MCH RBC QN AUTO: 27 PG (ref 26–34)
MCHC RBC AUTO-ENTMCNC: 32.7 G/DL (ref 31–37)
MCV RBC AUTO: 82.6 FL (ref 80–100)
MIXED CELL %: 5.9 %
NEUTROPHILS # BLD AUTO: 0.9 X10ˆ3/UL (ref 1.5–7.7)
NEUTROPHILS NFR BLD AUTO: 59.8 %
P AXIS: 77 DEGREES
P-R INTERVAL: 166 MS
PLATELET # BLD AUTO: 163 X10ˆ3/UL (ref 150–450)
POCT INFLUENZA A: NEGATIVE
POCT INFLUENZA B: NEGATIVE
POTASSIUM BLD-SCNC: 4 MMOL/L (ref 3.6–5.1)
Q-T INTERVAL: 376 MS
QRS DURATION: 66 MS
QTC CALCULATION (BEZET): 431 MS
R AXIS: 59 DEGREES
RBC # BLD AUTO: 4.07 X10ˆ6/UL
SARS-COV-2 RNA RESP QL NAA+PROBE: NOT DETECTED
SODIUM BLD-SCNC: 133 MMOL/L (ref 136–145)
T AXIS: 73 DEGREES
TROPONIN I BLD-MCNC: <0.02 NG/ML
VENTRICULAR RATE: 79 BPM
WBC # BLD AUTO: 1.5 X10ˆ3/UL (ref 4–11)

## 2023-11-19 PROCEDURE — U0002 COVID-19 LAB TEST NON-CDC: HCPCS | Performed by: PHYSICIAN ASSISTANT

## 2023-11-19 PROCEDURE — 96374 THER/PROPH/DIAG INJ IV PUSH: CPT | Performed by: PHYSICIAN ASSISTANT

## 2023-11-19 PROCEDURE — 99214 OFFICE O/P EST MOD 30 MIN: CPT | Performed by: PHYSICIAN ASSISTANT

## 2023-11-19 PROCEDURE — 85025 COMPLETE CBC W/AUTO DIFF WBC: CPT | Performed by: PHYSICIAN ASSISTANT

## 2023-11-19 PROCEDURE — 84484 ASSAY OF TROPONIN QUANT: CPT | Performed by: PHYSICIAN ASSISTANT

## 2023-11-19 PROCEDURE — 80047 BASIC METABLC PNL IONIZED CA: CPT | Performed by: PHYSICIAN ASSISTANT

## 2023-11-19 PROCEDURE — 71046 X-RAY EXAM CHEST 2 VIEWS: CPT | Performed by: PHYSICIAN ASSISTANT

## 2023-11-19 PROCEDURE — 87502 INFLUENZA DNA AMP PROBE: CPT | Performed by: PHYSICIAN ASSISTANT

## 2023-11-19 PROCEDURE — 93000 ELECTROCARDIOGRAM COMPLETE: CPT | Performed by: PHYSICIAN ASSISTANT

## 2023-11-19 PROCEDURE — 96361 HYDRATE IV INFUSION ADD-ON: CPT | Performed by: PHYSICIAN ASSISTANT

## 2023-11-19 RX ORDER — CHLORHEXIDINE GLUCONATE ORAL RINSE 1.2 MG/ML
15 SOLUTION DENTAL 2 TIMES DAILY
Qty: 473 ML | Refills: 0 | Status: SHIPPED | OUTPATIENT
Start: 2023-11-19

## 2023-11-19 RX ORDER — KETOROLAC TROMETHAMINE 15 MG/ML
30 INJECTION, SOLUTION INTRAMUSCULAR; INTRAVENOUS EVERY 6 HOURS PRN
Status: DISCONTINUED | OUTPATIENT
Start: 2023-11-19 | End: 2023-11-19

## 2023-11-19 RX ORDER — AMOXICILLIN 500 MG/1
500 TABLET, FILM COATED ORAL 3 TIMES DAILY
Qty: 30 TABLET | Refills: 0 | Status: SHIPPED | OUTPATIENT
Start: 2023-11-19 | End: 2023-11-22

## 2023-11-19 RX ORDER — SODIUM CHLORIDE 9 MG/ML
1000 INJECTION, SOLUTION INTRAVENOUS ONCE
Status: COMPLETED | OUTPATIENT
Start: 2023-11-19 | End: 2023-11-19

## 2023-11-19 RX ORDER — ONDANSETRON 4 MG/1
4 TABLET, ORALLY DISINTEGRATING ORAL EVERY 4 HOURS PRN
Qty: 10 TABLET | Refills: 0 | Status: SHIPPED | OUTPATIENT
Start: 2023-11-19 | End: 2023-11-22

## 2023-11-19 RX ORDER — IBUPROFEN 600 MG/1
600 TABLET ORAL EVERY 8 HOURS PRN
Qty: 21 TABLET | Refills: 0 | Status: SHIPPED | OUTPATIENT
Start: 2023-11-19 | End: 2023-11-26

## 2023-11-19 NOTE — ED INITIAL ASSESSMENT (HPI)
2 WEEKS DENTAL PAIN OFF AND ON  FEVER CHILLS  LOOSE STOOLS  ABDOMINAL PAIN  TAKING MIRALAX  FEELING WEAK AND DIZZY  LEFT SIDED CHEST PAIN AND LEFT ARM PAIN SINCE LAST NIGHT

## 2023-11-20 ENCOUNTER — TELEPHONE (OUTPATIENT)
Dept: FAMILY MEDICINE CLINIC | Facility: CLINIC | Age: 53
End: 2023-11-20

## 2023-11-20 NOTE — TELEPHONE ENCOUNTER
Home phone call received at 9 AM November 19, 2023. Patient complained of poor appetite and fever. There was itchiness in the left arm. She states that she had gum swelling for approximately 1 to 2 weeks. Should become sweaty and dizzy. Given this combination of symptoms I was concerned for dental abscess. I recommended an urgent care visit for evaluation. The birth date on the message was given as June 6, 1960 however this phone number matches the patient and is the number that I called to speak with her.

## 2023-11-22 ENCOUNTER — OFFICE VISIT (OUTPATIENT)
Dept: RHEUMATOLOGY | Facility: CLINIC | Age: 53
End: 2023-11-22
Payer: COMMERCIAL

## 2023-11-22 VITALS
WEIGHT: 106 LBS | HEART RATE: 64 BPM | BODY MASS INDEX: 20.81 KG/M2 | DIASTOLIC BLOOD PRESSURE: 56 MMHG | OXYGEN SATURATION: 96 % | RESPIRATION RATE: 16 BRPM | HEIGHT: 60 IN | SYSTOLIC BLOOD PRESSURE: 90 MMHG | TEMPERATURE: 99 F

## 2023-11-22 DIAGNOSIS — K12.1 ORAL ULCER: ICD-10-CM

## 2023-11-22 DIAGNOSIS — M32.9 SYSTEMIC LUPUS ERYTHEMATOSUS, UNSPECIFIED SLE TYPE, UNSPECIFIED ORGAN INVOLVEMENT STATUS (HCC): Primary | ICD-10-CM

## 2023-11-22 DIAGNOSIS — Z11.59 NEED FOR HEPATITIS C SCREENING TEST: ICD-10-CM

## 2023-11-22 DIAGNOSIS — R76.8 HEPATITIS B CORE ANTIBODY POSITIVE: ICD-10-CM

## 2023-11-22 DIAGNOSIS — Z11.59 NEED FOR HEPATITIS B SCREENING TEST: ICD-10-CM

## 2023-11-22 PROCEDURE — 3074F SYST BP LT 130 MM HG: CPT | Performed by: INTERNAL MEDICINE

## 2023-11-22 PROCEDURE — 3078F DIAST BP <80 MM HG: CPT | Performed by: INTERNAL MEDICINE

## 2023-11-22 PROCEDURE — 3008F BODY MASS INDEX DOCD: CPT | Performed by: INTERNAL MEDICINE

## 2023-11-22 PROCEDURE — 99214 OFFICE O/P EST MOD 30 MIN: CPT | Performed by: INTERNAL MEDICINE

## 2023-11-22 RX ORDER — HYDROXYCHLOROQUINE SULFATE 200 MG/1
200 TABLET, FILM COATED ORAL DAILY
Qty: 90 TABLET | Refills: 3 | Status: SHIPPED | OUTPATIENT
Start: 2023-11-22

## 2023-11-22 RX ORDER — ACYCLOVIR 400 MG/1
TABLET ORAL
COMMUNITY
Start: 2023-11-21

## 2023-11-22 NOTE — PATIENT INSTRUCTIONS
Continue hydroxychloroquine (plaquenil) 1/2 pill daily x 2 months, then 1 pill daily    Repeat blood work and urine tests next Monday

## 2023-11-30 ENCOUNTER — TELEPHONE (OUTPATIENT)
Dept: RHEUMATOLOGY | Facility: CLINIC | Age: 53
End: 2023-11-30

## 2023-11-30 RX ORDER — NAPROXEN 500 MG/1
500 TABLET ORAL 2 TIMES DAILY
Qty: 60 TABLET | Refills: 1 | Status: SHIPPED | OUTPATIENT
Start: 2023-11-30

## 2023-11-30 NOTE — TELEPHONE ENCOUNTER
Pt called stating that she started the acyclovir after her appointment last week and feels her mouth sores are better, but that her muscle aches and weakness are worse. Pt wondering what she should do to treat. Pt aware that Dr. Ricarda Copeland is out of the office this week, but her message will be routed to partner for treatment advise.

## 2023-11-30 NOTE — TELEPHONE ENCOUNTER
Spoke with patient Mrs. Keller Mayor low hallway finger all while you got a lot of stuff, she feels lousy aches and pains throughout her body. Feels a bit congested and bringing up some phlegm. Told to take Mucinex twice a day. Told to drink plenty of water. For aches and pains take naproxen 500 mg twice a day. On review of chart possibility of flare of lupus or Sjogren's. Told to do the lab tests right now tomorrow Dr. Steve Tony ordered so we can understand if there is a flareup of her autoimmune disease.   Dr. Avinash Peters

## 2023-12-01 ENCOUNTER — LAB ENCOUNTER (OUTPATIENT)
Dept: LAB | Age: 53
End: 2023-12-01
Attending: INTERNAL MEDICINE
Payer: COMMERCIAL

## 2023-12-01 DIAGNOSIS — Z51.81 THERAPEUTIC DRUG MONITORING: ICD-10-CM

## 2023-12-01 DIAGNOSIS — M32.9 SYSTEMIC LUPUS ERYTHEMATOSUS, UNSPECIFIED SLE TYPE, UNSPECIFIED ORGAN INVOLVEMENT STATUS (HCC): ICD-10-CM

## 2023-12-01 DIAGNOSIS — Z11.59 NEED FOR HEPATITIS B SCREENING TEST: ICD-10-CM

## 2023-12-01 DIAGNOSIS — K12.1 ORAL ULCER: ICD-10-CM

## 2023-12-01 DIAGNOSIS — D84.821 IMMUNODEFICIENCY DUE TO DRUG THERAPY: ICD-10-CM

## 2023-12-01 DIAGNOSIS — R76.8 HEPATITIS B CORE ANTIBODY POSITIVE: ICD-10-CM

## 2023-12-01 DIAGNOSIS — R74.01 ELEVATED AST (SGOT): ICD-10-CM

## 2023-12-01 DIAGNOSIS — Z79.899 IMMUNODEFICIENCY DUE TO DRUG THERAPY: ICD-10-CM

## 2023-12-01 LAB
ALBUMIN SERPL-MCNC: 2.6 G/DL (ref 3.4–5)
ALBUMIN/GLOB SERPL: 0.7 {RATIO} (ref 1–2)
ALP LIVER SERPL-CCNC: 273 U/L
ALT SERPL-CCNC: 344 U/L
ANION GAP SERPL CALC-SCNC: 4 MMOL/L (ref 0–18)
AST SERPL-CCNC: 910 U/L (ref 15–37)
BASOPHILS # BLD AUTO: 0 X10(3) UL (ref 0–0.2)
BASOPHILS NFR BLD AUTO: 0 %
BILIRUB SERPL-MCNC: 0.4 MG/DL (ref 0.1–2)
BILIRUB UR QL STRIP.AUTO: NEGATIVE
BUN BLD-MCNC: 8 MG/DL (ref 9–23)
C3 SERPL-MCNC: 27.9 MG/DL (ref 90–180)
C4 SERPL-MCNC: 6.2 MG/DL (ref 10–40)
CALCIUM BLD-MCNC: 7.7 MG/DL (ref 8.5–10.1)
CHLORIDE SERPL-SCNC: 93 MMOL/L (ref 98–112)
CLARITY UR REFRACT.AUTO: CLEAR
CO2 SERPL-SCNC: 27 MMOL/L (ref 21–32)
CREAT BLD-MCNC: 0.44 MG/DL
CREAT UR-SCNC: 22.3 MG/DL
CRP SERPL-MCNC: 1.16 MG/DL (ref ?–0.3)
DEPRECATED HBV CORE AB SER IA-ACNC: 3385.8 NG/ML
EGFRCR SERPLBLD CKD-EPI 2021: 114 ML/MIN/1.73M2 (ref 60–?)
EOSINOPHIL # BLD AUTO: 0 X10(3) UL (ref 0–0.7)
EOSINOPHIL NFR BLD AUTO: 0 %
ERYTHROCYTE [DISTWIDTH] IN BLOOD BY AUTOMATED COUNT: 13.4 %
ERYTHROCYTE [SEDIMENTATION RATE] IN BLOOD: 63 MM/HR
FASTING STATUS PATIENT QL REPORTED: NO
FOLATE SERPL-MCNC: 26.9 NG/ML (ref 8.7–?)
GLOBULIN PLAS-MCNC: 3.7 G/DL (ref 2.8–4.4)
GLUCOSE BLD-MCNC: 98 MG/DL (ref 70–99)
GLUCOSE UR STRIP.AUTO-MCNC: NORMAL MG/DL
HBV SURFACE AG SER-ACNC: <0.1 [IU]/L
HBV SURFACE AG SERPL QL IA: NONREACTIVE
HCT VFR BLD AUTO: 33.6 %
HGB BLD-MCNC: 11.1 G/DL
IMM GRANULOCYTES # BLD AUTO: 0.02 X10(3) UL (ref 0–1)
IMM GRANULOCYTES NFR BLD: 1.7 %
IRON SATN MFR SERPL: 25 %
IRON SERPL-MCNC: 64 UG/DL
KETONES UR STRIP.AUTO-MCNC: NEGATIVE MG/DL
LEUKOCYTE ESTERASE UR QL STRIP.AUTO: NEGATIVE
LYMPHOCYTES # BLD AUTO: 0.28 X10(3) UL (ref 1–4)
LYMPHOCYTES NFR BLD AUTO: 23.1 %
MCH RBC QN AUTO: 26.7 PG (ref 26–34)
MCHC RBC AUTO-ENTMCNC: 33 G/DL (ref 31–37)
MCV RBC AUTO: 80.8 FL
MONOCYTES # BLD AUTO: 0.11 X10(3) UL (ref 0.1–1)
MONOCYTES NFR BLD AUTO: 9.1 %
NEUTROPHILS # BLD AUTO: 0.8 X10 (3) UL (ref 1.5–7.7)
NEUTROPHILS # BLD AUTO: 0.8 X10(3) UL (ref 1.5–7.7)
NEUTROPHILS NFR BLD AUTO: 66.1 %
NITRITE UR QL STRIP.AUTO: NEGATIVE
OSMOLALITY SERPL CALC.SUM OF ELEC: 256 MOSM/KG (ref 275–295)
PH UR STRIP.AUTO: 6.5 [PH] (ref 5–8)
PLATELET # BLD AUTO: 88 10(3)UL (ref 150–450)
POTASSIUM SERPL-SCNC: 4 MMOL/L (ref 3.5–5.1)
PROT SERPL-MCNC: 6.3 G/DL (ref 6.4–8.2)
PROT UR STRIP.AUTO-MCNC: NEGATIVE MG/DL
PROT UR-MCNC: 6.4 MG/DL
RBC # BLD AUTO: 4.16 X10(6)UL
RBC UR QL AUTO: NEGATIVE
SODIUM SERPL-SCNC: 124 MMOL/L (ref 136–145)
SP GR UR STRIP.AUTO: 1 (ref 1–1.03)
TIBC SERPL-MCNC: 253 UG/DL (ref 240–450)
TRANSFERRIN SERPL-MCNC: 170 MG/DL (ref 200–360)
TSI SER-ACNC: 3.08 MIU/ML (ref 0.36–3.74)
UROBILINOGEN UR STRIP.AUTO-MCNC: NORMAL MG/DL
VIT B12 SERPL-MCNC: >2000 PG/ML (ref 193–986)
WBC # BLD AUTO: 1.2 X10(3) UL (ref 4–11)

## 2023-12-01 PROCEDURE — 86256 FLUORESCENT ANTIBODY TITER: CPT

## 2023-12-01 PROCEDURE — 82550 ASSAY OF CK (CPK): CPT

## 2023-12-01 PROCEDURE — 86160 COMPLEMENT ANTIGEN: CPT

## 2023-12-01 PROCEDURE — 82607 VITAMIN B-12: CPT

## 2023-12-01 PROCEDURE — 85025 COMPLETE CBC W/AUTO DIFF WBC: CPT

## 2023-12-01 PROCEDURE — 84443 ASSAY THYROID STIM HORMONE: CPT

## 2023-12-01 PROCEDURE — 82746 ASSAY OF FOLIC ACID SERUM: CPT

## 2023-12-01 PROCEDURE — 83540 ASSAY OF IRON: CPT

## 2023-12-01 PROCEDURE — 86140 C-REACTIVE PROTEIN: CPT

## 2023-12-01 PROCEDURE — 80053 COMPREHEN METABOLIC PANEL: CPT

## 2023-12-01 PROCEDURE — 83550 IRON BINDING TEST: CPT

## 2023-12-01 PROCEDURE — 87340 HEPATITIS B SURFACE AG IA: CPT

## 2023-12-01 PROCEDURE — 82728 ASSAY OF FERRITIN: CPT

## 2023-12-01 PROCEDURE — 82570 ASSAY OF URINE CREATININE: CPT

## 2023-12-01 PROCEDURE — 84156 ASSAY OF PROTEIN URINE: CPT

## 2023-12-01 PROCEDURE — 87517 HEPATITIS B DNA QUANT: CPT

## 2023-12-01 PROCEDURE — 81003 URINALYSIS AUTO W/O SCOPE: CPT

## 2023-12-01 PROCEDURE — 85652 RBC SED RATE AUTOMATED: CPT

## 2023-12-03 ENCOUNTER — TELEPHONE (OUTPATIENT)
Dept: RHEUMATOLOGY | Facility: CLINIC | Age: 53
End: 2023-12-03

## 2023-12-03 DIAGNOSIS — M32.9 SYSTEMIC LUPUS ERYTHEMATOSUS, UNSPECIFIED SLE TYPE, UNSPECIFIED ORGAN INVOLVEMENT STATUS (HCC): Primary | ICD-10-CM

## 2023-12-03 DIAGNOSIS — Z79.899 IMMUNODEFICIENCY DUE TO DRUG THERAPY: ICD-10-CM

## 2023-12-03 DIAGNOSIS — R74.01 ELEVATED AST (SGOT): ICD-10-CM

## 2023-12-03 DIAGNOSIS — D84.821 IMMUNODEFICIENCY DUE TO DRUG THERAPY: ICD-10-CM

## 2023-12-03 DIAGNOSIS — Z51.81 THERAPEUTIC DRUG MONITORING: ICD-10-CM

## 2023-12-03 LAB — CK SERPL-CCNC: 488 U/L

## 2023-12-04 ENCOUNTER — TELEPHONE (OUTPATIENT)
Dept: RHEUMATOLOGY | Facility: CLINIC | Age: 53
End: 2023-12-04

## 2023-12-04 DIAGNOSIS — R74.8 ELEVATED CK: ICD-10-CM

## 2023-12-04 DIAGNOSIS — M32.9 SYSTEMIC LUPUS ERYTHEMATOSUS, UNSPECIFIED SLE TYPE, UNSPECIFIED ORGAN INVOLVEMENT STATUS (HCC): Primary | ICD-10-CM

## 2023-12-04 DIAGNOSIS — R74.01 ELEVATED ALT MEASUREMENT: ICD-10-CM

## 2023-12-04 DIAGNOSIS — R74.01 ELEVATED AST (SGOT): ICD-10-CM

## 2023-12-04 RX ORDER — PREDNISONE 20 MG/1
20 TABLET ORAL DAILY
Qty: 30 TABLET | Refills: 0 | Status: SHIPPED
Start: 2023-12-04 | End: 2024-01-03

## 2023-12-04 RX ORDER — PREDNISONE 20 MG/1
20 TABLET ORAL DAILY
Qty: 30 TABLET | Refills: 0 | Status: SHIPPED | OUTPATIENT
Start: 2023-12-04 | End: 2023-12-04

## 2023-12-04 RX ORDER — PREDNISONE 10 MG/1
20 TABLET ORAL DAILY
Qty: 60 TABLET | Refills: 0 | Status: SHIPPED
Start: 2023-12-04 | End: 2024-01-03

## 2023-12-04 RX ORDER — PREDNISONE 20 MG/1
20 TABLET ORAL DAILY
Qty: 30 TABLET | Refills: 0 | Status: SHIPPED
Start: 2023-12-04 | End: 2023-12-04

## 2023-12-04 NOTE — TELEPHONE ENCOUNTER
Pt called office, pt waiting for Dr. Nini Bauer to return her call. Pt having difficulty walking, her legs feel weak. Pain to bilateral thighs. Pt has blood work completed this weekend, and would like to discuss results.

## 2023-12-04 NOTE — TELEPHONE ENCOUNTER
Started acyclovir on Thanksgiving day. Worsening pain and weakness with walking. Lots of achiness. Stopped acyclovir last Monday, 1 week ago.

## 2023-12-05 ENCOUNTER — LAB ENCOUNTER (OUTPATIENT)
Dept: LAB | Age: 53
End: 2023-12-05
Attending: INTERNAL MEDICINE
Payer: COMMERCIAL

## 2023-12-05 ENCOUNTER — OFFICE VISIT (OUTPATIENT)
Dept: FAMILY MEDICINE CLINIC | Facility: CLINIC | Age: 53
End: 2023-12-05
Payer: COMMERCIAL

## 2023-12-05 ENCOUNTER — TELEPHONE (OUTPATIENT)
Dept: RHEUMATOLOGY | Facility: CLINIC | Age: 53
End: 2023-12-05

## 2023-12-05 ENCOUNTER — OFFICE VISIT (OUTPATIENT)
Dept: RHEUMATOLOGY | Facility: CLINIC | Age: 53
End: 2023-12-05
Payer: COMMERCIAL

## 2023-12-05 VITALS
SYSTOLIC BLOOD PRESSURE: 92 MMHG | OXYGEN SATURATION: 98 % | WEIGHT: 107 LBS | BODY MASS INDEX: 21.01 KG/M2 | HEIGHT: 60 IN | HEART RATE: 94 BPM | DIASTOLIC BLOOD PRESSURE: 56 MMHG

## 2023-12-05 VITALS
SYSTOLIC BLOOD PRESSURE: 100 MMHG | BODY MASS INDEX: 20.58 KG/M2 | HEART RATE: 94 BPM | OXYGEN SATURATION: 98 % | RESPIRATION RATE: 16 BRPM | WEIGHT: 104.81 LBS | DIASTOLIC BLOOD PRESSURE: 58 MMHG | TEMPERATURE: 98 F | HEIGHT: 60 IN

## 2023-12-05 DIAGNOSIS — M32.9 SYSTEMIC LUPUS ERYTHEMATOSUS, UNSPECIFIED SLE TYPE, UNSPECIFIED ORGAN INVOLVEMENT STATUS (HCC): ICD-10-CM

## 2023-12-05 DIAGNOSIS — R21 RASH: ICD-10-CM

## 2023-12-05 DIAGNOSIS — R74.01 ELEVATED AST (SGOT): ICD-10-CM

## 2023-12-05 DIAGNOSIS — D84.821 IMMUNODEFICIENCY DUE TO DRUG THERAPY: ICD-10-CM

## 2023-12-05 DIAGNOSIS — R79.89 LOW SERUM CALCIUM: ICD-10-CM

## 2023-12-05 DIAGNOSIS — E87.1 HYPONATREMIA: ICD-10-CM

## 2023-12-05 DIAGNOSIS — B25.9 CYTOMEGALOVIRUS INFECTION, UNSPECIFIED CYTOMEGALOVIRAL INFECTION TYPE (HCC): Primary | ICD-10-CM

## 2023-12-05 DIAGNOSIS — R74.01 ELEVATED ALT MEASUREMENT: ICD-10-CM

## 2023-12-05 DIAGNOSIS — Z51.81 THERAPEUTIC DRUG MONITORING: ICD-10-CM

## 2023-12-05 DIAGNOSIS — D61.818 PANCYTOPENIA (HCC): ICD-10-CM

## 2023-12-05 DIAGNOSIS — R01.1 HEART MURMUR: ICD-10-CM

## 2023-12-05 DIAGNOSIS — K30 INDIGESTION: ICD-10-CM

## 2023-12-05 DIAGNOSIS — R74.8 ELEVATED CK: ICD-10-CM

## 2023-12-05 DIAGNOSIS — Z79.899 IMMUNODEFICIENCY DUE TO DRUG THERAPY: ICD-10-CM

## 2023-12-05 DIAGNOSIS — K13.70 UNSPECIFIED LESIONS OF ORAL MUCOSA: Primary | ICD-10-CM

## 2023-12-05 DIAGNOSIS — R74.8 ELEVATED LIVER ENZYMES: ICD-10-CM

## 2023-12-05 DIAGNOSIS — R77.8 LOW SERUM COMPLEMENT C3: ICD-10-CM

## 2023-12-05 LAB
ALBUMIN SERPL-MCNC: 2.7 G/DL (ref 3.4–5)
ALBUMIN/GLOB SERPL: 0.7 {RATIO} (ref 1–2)
ALP LIVER SERPL-CCNC: 543 U/L
ALT SERPL-CCNC: 350 U/L
ANION GAP SERPL CALC-SCNC: 4 MMOL/L (ref 0–18)
AST SERPL-CCNC: 1057 U/L (ref 15–37)
BASOPHILS # BLD AUTO: 0.01 X10(3) UL (ref 0–0.2)
BASOPHILS NFR BLD AUTO: 0.6 %
BILIRUB SERPL-MCNC: 0.6 MG/DL (ref 0.1–2)
BUN BLD-MCNC: 7 MG/DL (ref 9–23)
CALCIUM BLD-MCNC: 8.1 MG/DL (ref 8.5–10.1)
CHLORIDE SERPL-SCNC: 103 MMOL/L (ref 98–112)
CK SERPL-CCNC: 341 U/L
CO2 SERPL-SCNC: 28 MMOL/L (ref 21–32)
CREAT BLD-MCNC: 0.55 MG/DL
DEPRECATED HBV CORE AB SER IA-ACNC: 2574.4 NG/ML
EGFRCR SERPLBLD CKD-EPI 2021: 108 ML/MIN/1.73M2 (ref 60–?)
EOSINOPHIL # BLD AUTO: 0 X10(3) UL (ref 0–0.7)
EOSINOPHIL NFR BLD AUTO: 0 %
ERYTHROCYTE [DISTWIDTH] IN BLOOD BY AUTOMATED COUNT: 14.5 %
FASTING STATUS PATIENT QL REPORTED: NO
GLOBULIN PLAS-MCNC: 4.1 G/DL (ref 2.8–4.4)
GLUCOSE BLD-MCNC: 136 MG/DL (ref 70–99)
HAV IGM SER QL: NONREACTIVE
HBV CORE IGM SER QL: NONREACTIVE
HBV SURFACE AG SERPL QL IA: NONREACTIVE
HCT VFR BLD AUTO: 32.8 %
HCV AB SERPL QL IA: NONREACTIVE
HETEROPH AB SER QL: NEGATIVE
HGB BLD-MCNC: 11.2 G/DL
IMM GRANULOCYTES # BLD AUTO: 0.05 X10(3) UL (ref 0–1)
IMM GRANULOCYTES NFR BLD: 2.9 %
LYMPHOCYTES # BLD AUTO: 0.37 X10(3) UL (ref 1–4)
LYMPHOCYTES NFR BLD AUTO: 21.8 %
MCH RBC QN AUTO: 27.1 PG (ref 26–34)
MCHC RBC AUTO-ENTMCNC: 34.1 G/DL (ref 31–37)
MCV RBC AUTO: 79.4 FL
MONOCYTES # BLD AUTO: 0.18 X10(3) UL (ref 0.1–1)
MONOCYTES NFR BLD AUTO: 10.6 %
NEUTROPHILS # BLD AUTO: 1.09 X10 (3) UL (ref 1.5–7.7)
NEUTROPHILS # BLD AUTO: 1.09 X10(3) UL (ref 1.5–7.7)
NEUTROPHILS NFR BLD AUTO: 64.1 %
OSMOLALITY SERPL CALC.SUM OF ELEC: 280 MOSM/KG (ref 275–295)
PLATELET # BLD AUTO: 102 10(3)UL (ref 150–450)
POTASSIUM SERPL-SCNC: 4 MMOL/L (ref 3.5–5.1)
PROT SERPL-MCNC: 6.8 G/DL (ref 6.4–8.2)
PTH-INTACT SERPL-MCNC: 29.7 PG/ML (ref 18.5–88)
RBC # BLD AUTO: 4.13 X10(6)UL
SODIUM SERPL-SCNC: 135 MMOL/L (ref 136–145)
TRIGL SERPL-MCNC: 258 MG/DL (ref 30–149)
WBC # BLD AUTO: 1.7 X10(3) UL (ref 4–11)

## 2023-12-05 PROCEDURE — 3008F BODY MASS INDEX DOCD: CPT | Performed by: INTERNAL MEDICINE

## 2023-12-05 PROCEDURE — 86644 CMV ANTIBODY: CPT

## 2023-12-05 PROCEDURE — 80053 COMPREHEN METABOLIC PANEL: CPT

## 2023-12-05 PROCEDURE — 82728 ASSAY OF FERRITIN: CPT

## 2023-12-05 PROCEDURE — 3074F SYST BP LT 130 MM HG: CPT | Performed by: FAMILY MEDICINE

## 2023-12-05 PROCEDURE — 99214 OFFICE O/P EST MOD 30 MIN: CPT | Performed by: FAMILY MEDICINE

## 2023-12-05 PROCEDURE — 83516 IMMUNOASSAY NONANTIBODY: CPT

## 2023-12-05 PROCEDURE — 82550 ASSAY OF CK (CPK): CPT

## 2023-12-05 PROCEDURE — G0452 MOLECULAR PATHOLOGY INTERPR: HCPCS | Performed by: FAMILY MEDICINE

## 2023-12-05 PROCEDURE — 3078F DIAST BP <80 MM HG: CPT | Performed by: INTERNAL MEDICINE

## 2023-12-05 PROCEDURE — 87529 HSV DNA AMP PROBE: CPT | Performed by: FAMILY MEDICINE

## 2023-12-05 PROCEDURE — 86645 CMV ANTIBODY IGM: CPT

## 2023-12-05 PROCEDURE — 99215 OFFICE O/P EST HI 40 MIN: CPT | Performed by: INTERNAL MEDICINE

## 2023-12-05 PROCEDURE — 86403 PARTICLE AGGLUT ANTBDY SCRN: CPT

## 2023-12-05 PROCEDURE — 3074F SYST BP LT 130 MM HG: CPT | Performed by: INTERNAL MEDICINE

## 2023-12-05 PROCEDURE — 83970 ASSAY OF PARATHORMONE: CPT

## 2023-12-05 PROCEDURE — 84478 ASSAY OF TRIGLYCERIDES: CPT

## 2023-12-05 PROCEDURE — 80074 ACUTE HEPATITIS PANEL: CPT

## 2023-12-05 PROCEDURE — 85025 COMPLETE CBC W/AUTO DIFF WBC: CPT

## 2023-12-05 PROCEDURE — 3008F BODY MASS INDEX DOCD: CPT | Performed by: FAMILY MEDICINE

## 2023-12-05 PROCEDURE — 3078F DIAST BP <80 MM HG: CPT | Performed by: FAMILY MEDICINE

## 2023-12-05 RX ORDER — FAMOTIDINE 20 MG/1
20 TABLET, FILM COATED ORAL 2 TIMES DAILY
Qty: 60 TABLET | Refills: 1 | Status: SHIPPED
Start: 2023-12-05 | End: 2023-12-05

## 2023-12-05 NOTE — PATIENT INSTRUCTIONS
Schedule echocardiogram.     Continue prednisone 20 mg daily    Can start hydroxychloroquine (plaquenil) when feeling better.

## 2023-12-05 NOTE — TELEPHONE ENCOUNTER
Gianluca Alva from [facility] called office, pt was at lab this am drawing blood. Saw that pt had complements drawn and few days ago and wondering if Dr. Salo Patel would like repeated today. Has also not ran Smooth muscle, mono CMV and hep. Have an extra tube of blood incase he would like these ran at this time. Please advise -- call back [phone].

## 2023-12-05 NOTE — PROGRESS NOTES
Subjective:   Patient ID: Burke Wilson is a 52year old female. Oral ulcers for about 4 weeks. Saw oral surgeon who thought it was herpes outbreak 2 weeks ago. Took acyclovir which didn't help. Rash on body, hands started 3-4 weeks. No change. Not itchying no pain. Weakness and pain in muscles. + joint pains. Indigestion. Coughing and mucus. Temp under 100. Cold in evenings. Occasional sweats. Had gone to  for chest pain but testing was negative. Mild SOB. 3-4 bottles of fluids daily. Urination is normal.        History/Other:   Review of Systems   All other systems reviewed and are negative. Current Outpatient Medications   Medication Sig Dispense Refill    predniSONE 20 MG Oral Tab Take 1 tablet (20 mg total) by mouth daily. 30 tablet 0    predniSONE 10 MG Oral Tab Take 2 tablets (20 mg total) by mouth daily. 60 tablet 0     Allergies: Allergies   Allergen Reactions    Acyclovir HIVES and PAIN    Amoxicillin HIVES    Pantoprazole DIARRHEA and HIVES       Objective:   Physical Exam  Vitals reviewed. Constitutional:       General: She is not in acute distress. Appearance: She is well-developed. She is not diaphoretic. HENT:      Mouth/Throat:      Comments: Oral ulcer on left post cheek. Eyes:      General: No scleral icterus. Right eye: No discharge. Left eye: No discharge. Conjunctiva/sclera: Conjunctivae normal.   Cardiovascular:      Rate and Rhythm: Normal rate and regular rhythm. Heart sounds: Normal heart sounds. No murmur heard. No friction rub. No gallop. Pulmonary:      Effort: Pulmonary effort is normal. No respiratory distress. Breath sounds: Normal breath sounds. No wheezing or rales. Abdominal:      General: Bowel sounds are normal.      Palpations: Abdomen is soft. There is no mass. Comments: Mild epigastric tenderness. Musculoskeletal:      Comments: Weakness and pain in muscle of arms and legs b/l.    Skin: Comments: Rash torso, hands. Small red spots. Assessment & Plan:   1. Unspecified lesions of oral mucosa      1. Unspecified lesions of oral mucosa  - Oral Surgery Referral - In Network  - HSV 1/2 Subtype by PCR (Lesion-Only); Future  - HSV 1/2 Subtype by PCR (Lesion-Only)    2. Pancytopenia [facility])  - Oncology/Hematology Referral - In Network  - Oral Surgery Referral - In Network    3. Systemic lupus erythematosus, unspecified SLE type, unspecified organ involvement status ([facility]  - Oral Surgery Referral - In Network    4. Rash  Steroids. 5. Hyponatremia  - Nephrology Referral - [facility])    6. Elevated liver enzymes  - US LIVER WITH ELASTOGRAPHY(CPT=76705,42061); Future    7. Indigestion  PPI    8. Low serum calcium  - PTH, Intact [E];  Future      Meds This Visit:  Requested Prescriptions      No prescriptions requested or ordered in this encounter       Imaging & Referrals:  ORAL AND MAXILLOFACIAL SURGERY - INTERNAL

## 2023-12-05 NOTE — TELEPHONE ENCOUNTER
Call back lab. Need to add on anti-mitochondral antibody, smooth muscle antibody, Monospot, CMV and hepatitis panel acute.     Ok to hold off on C3/C4 since we only have one tube

## 2023-12-05 NOTE — TELEPHONE ENCOUNTER
Called lab, spoke to Tomi García. Advised all labs to be completed except for C3C4 at this time. . Voiced understanding.

## 2023-12-06 ENCOUNTER — TELEPHONE (OUTPATIENT)
Dept: RHEUMATOLOGY | Facility: CLINIC | Age: 53
End: 2023-12-06

## 2023-12-06 ENCOUNTER — LAB ENCOUNTER (OUTPATIENT)
Dept: LAB | Age: 53
End: 2023-12-06
Attending: INTERNAL MEDICINE
Payer: COMMERCIAL

## 2023-12-06 ENCOUNTER — PATIENT OUTREACH (OUTPATIENT)
Dept: INFECTIOUS DISEASE | Facility: CLINIC | Age: 53
End: 2023-12-06

## 2023-12-06 DIAGNOSIS — R77.8 LOW SERUM COMPLEMENT C3: ICD-10-CM

## 2023-12-06 DIAGNOSIS — R74.8 ELEVATED ALKALINE PHOSPHATASE LEVEL: Primary | ICD-10-CM

## 2023-12-06 DIAGNOSIS — R74.8 ELEVATED ALKALINE PHOSPHATASE LEVEL: ICD-10-CM

## 2023-12-06 DIAGNOSIS — M32.9 SYSTEMIC LUPUS ERYTHEMATOSUS, UNSPECIFIED SLE TYPE, UNSPECIFIED ORGAN INVOLVEMENT STATUS (HCC): ICD-10-CM

## 2023-12-06 DIAGNOSIS — R01.1 HEART MURMUR: ICD-10-CM

## 2023-12-06 DIAGNOSIS — B25.1 CMV HEPATITIS (HCC): ICD-10-CM

## 2023-12-06 DIAGNOSIS — R74.01 ELEVATED AST (SGOT): ICD-10-CM

## 2023-12-06 DIAGNOSIS — B25.1 CMV HEPATITIS (HCC): Primary | ICD-10-CM

## 2023-12-06 DIAGNOSIS — R74.8 ELEVATED CK: ICD-10-CM

## 2023-12-06 DIAGNOSIS — R74.01 ELEVATED ALT MEASUREMENT: ICD-10-CM

## 2023-12-06 LAB
ACTIN SMOOTH MUSCLE AB: 11 UNITS
ALBUMIN SERPL-MCNC: 2.7 G/DL (ref 3.4–5)
ALP LIVER SERPL-CCNC: 547 U/L
ALT SERPL-CCNC: 326 U/L
AST SERPL-CCNC: 967 U/L (ref 15–37)
BILIRUB DIRECT SERPL-MCNC: 0.3 MG/DL (ref 0–0.2)
BILIRUB SERPL-MCNC: 0.5 MG/DL (ref 0.1–2)
C3 SERPL-MCNC: 40.4 MG/DL (ref 90–180)
C4 SERPL-MCNC: 7.5 MG/DL (ref 10–40)
CK SERPL-CCNC: 352 U/L
CMV IGG AB: >10 U/ML
CMV IGM AB: 196 AU/ML
M2 MITOCHONDRIAL AB: <20 UNITS
PROT SERPL-MCNC: 7.2 G/DL (ref 6.4–8.2)

## 2023-12-06 PROCEDURE — 87040 BLOOD CULTURE FOR BACTERIA: CPT

## 2023-12-06 PROCEDURE — 84080 ASSAY ALKALINE PHOSPHATASES: CPT

## 2023-12-06 PROCEDURE — 36415 COLL VENOUS BLD VENIPUNCTURE: CPT

## 2023-12-06 PROCEDURE — 86160 COMPLEMENT ANTIGEN: CPT

## 2023-12-06 PROCEDURE — 84075 ASSAY ALKALINE PHOSPHATASE: CPT

## 2023-12-06 PROCEDURE — 87389 HIV-1 AG W/HIV-1&-2 AB AG IA: CPT

## 2023-12-06 PROCEDURE — 80076 HEPATIC FUNCTION PANEL: CPT

## 2023-12-06 PROCEDURE — 82550 ASSAY OF CK (CPK): CPT

## 2023-12-06 NOTE — PROGRESS NOTES
52year old with history of SLE, and was on hydroxychlorquine. She stopped after a month due to dizziness and had been on steroids intermittently. She reports developing mouth sores at the end of October and had diarrhea, and fever. She went to immediate care on 11/19 and referred to dentist.  Dentist found no acute dental issues and had suggested oral surgery evaluation for mouth sores. She was given acyclovir for possible herpes. She developed bilateral leg rash and lesions on the palms this week. Dr. Naif Galan has increased prednisone for lupus. Labs today show rising LFTS and was found to be CMV IGM positive. Patient is following with GI and scheduled for US/doppler. Can check CMV DNA PCR, CPK to rule out muscle enzyme source vs hepatic of elevated AST, HIV screen, repeat LFTS. Can start on valganciclovir due to being on prednisone, immunocompromised.

## 2023-12-07 ENCOUNTER — HOSPITAL ENCOUNTER (OUTPATIENT)
Dept: ULTRASOUND IMAGING | Facility: HOSPITAL | Age: 53
Discharge: HOME | End: 2023-12-07
Attending: FAMILY MEDICINE
Payer: COMMERCIAL

## 2023-12-07 ENCOUNTER — TELEPHONE (OUTPATIENT)
Dept: RHEUMATOLOGY | Facility: CLINIC | Age: 53
End: 2023-12-07

## 2023-12-07 ENCOUNTER — HOSPITAL ENCOUNTER (OUTPATIENT)
Dept: ULTRASOUND IMAGING | Facility: HOSPITAL | Age: 53
Discharge: HOME | End: 2023-12-07
Attending: INTERNAL MEDICINE
Payer: COMMERCIAL

## 2023-12-07 DIAGNOSIS — R74.8 ELEVATED LIVER ENZYMES: ICD-10-CM

## 2023-12-07 DIAGNOSIS — R74.01 ELEVATED AST (SGOT): ICD-10-CM

## 2023-12-07 DIAGNOSIS — R74.8 ELEVATED ALKALINE PHOSPHATASE LEVEL: ICD-10-CM

## 2023-12-07 LAB — DSDNA CRITHIDIA LUCILIAE IFA: POSITIVE

## 2023-12-07 PROCEDURE — 76700 US EXAM ABDOM COMPLETE: CPT | Performed by: INTERNAL MEDICINE

## 2023-12-07 PROCEDURE — 93975 VASCULAR STUDY: CPT | Performed by: INTERNAL MEDICINE

## 2023-12-07 PROCEDURE — 76705 ECHO EXAM OF ABDOMEN: CPT | Performed by: FAMILY MEDICINE

## 2023-12-07 PROCEDURE — 76981 USE PARENCHYMA: CPT | Performed by: FAMILY MEDICINE

## 2023-12-07 NOTE — TELEPHONE ENCOUNTER
Pt called office, was prescribed Valaciclovir by Dr. Lydia Velazquez and is having co stiff muscles. Similar to when she tried the acyclovir. Pt is also taking prednisone 10mg daily per Dr. Gordon Maher. Pt would like Dr. Gordon Maher to be aware. Advised pt to contact the ordering prescriber with today call to notify of above. Pt voices understanding.

## 2023-12-07 NOTE — TELEPHONE ENCOUNTER
Meghann from [facility] called with STAT ultrasound results for patient. Meghann stated the ultrasound was normal.

## 2023-12-08 ENCOUNTER — ORDER TRANSCRIPTION (OUTPATIENT)
Dept: INFECTIOUS DISEASE | Facility: CLINIC | Age: 53
End: 2023-12-08

## 2023-12-08 DIAGNOSIS — K75.9 HEPATITIS: Primary | ICD-10-CM

## 2023-12-08 LAB
ALK PHOSPHATASE: 551 IU/L
BONE FRAC: 43 %
HSV 1 NAA: NEGATIVE
HSV 1 NAA: NEGATIVE
HSV 2 NAA: NEGATIVE
HSV 2 NAA: NEGATIVE
INTESTINAL FRAC: 4 %
LIVER FRAC: 53 %

## 2023-12-10 ENCOUNTER — TELEPHONE (OUTPATIENT)
Dept: RHEUMATOLOGY | Facility: CLINIC | Age: 53
End: 2023-12-10

## 2023-12-10 DIAGNOSIS — R74.8 ELEVATED CK: ICD-10-CM

## 2023-12-10 DIAGNOSIS — R74.8 ELEVATED ALKALINE PHOSPHATASE LEVEL: Primary | ICD-10-CM

## 2023-12-10 DIAGNOSIS — R74.01 ELEVATED AST (SGOT): ICD-10-CM

## 2023-12-10 DIAGNOSIS — R79.89 ELEVATED FERRITIN: ICD-10-CM

## 2023-12-11 ENCOUNTER — OFFICE VISIT (OUTPATIENT)
Dept: HEMATOLOGY/ONCOLOGY | Facility: HOSPITAL | Age: 53
End: 2023-12-11
Attending: INTERNAL MEDICINE
Payer: COMMERCIAL

## 2023-12-11 VITALS
OXYGEN SATURATION: 98 % | BODY MASS INDEX: 20 KG/M2 | DIASTOLIC BLOOD PRESSURE: 74 MMHG | TEMPERATURE: 98 F | HEART RATE: 111 BPM | SYSTOLIC BLOOD PRESSURE: 116 MMHG | WEIGHT: 102.81 LBS | RESPIRATION RATE: 18 BRPM

## 2023-12-11 DIAGNOSIS — R74.8 ELEVATED CK: ICD-10-CM

## 2023-12-11 DIAGNOSIS — R74.01 ELEVATED AST (SGOT): ICD-10-CM

## 2023-12-11 DIAGNOSIS — R74.8 ELEVATED ALKALINE PHOSPHATASE LEVEL: ICD-10-CM

## 2023-12-11 DIAGNOSIS — B25.1 CYTOMEGALIC INCLUSION VIRUS HEPATITIS (HCC): ICD-10-CM

## 2023-12-11 DIAGNOSIS — M32.9 SYSTEMIC LUPUS ERYTHEMATOSUS, UNSPECIFIED SLE TYPE, UNSPECIFIED ORGAN INVOLVEMENT STATUS (HCC): ICD-10-CM

## 2023-12-11 DIAGNOSIS — D61.818 PANCYTOPENIA (HCC): Primary | ICD-10-CM

## 2023-12-11 DIAGNOSIS — R79.89 ELEVATED FERRITIN: ICD-10-CM

## 2023-12-11 LAB
ALBUMIN SERPL-MCNC: 2.6 G/DL (ref 3.4–5)
ALBUMIN/GLOB SERPL: 0.6 {RATIO} (ref 1–2)
ALP LIVER SERPL-CCNC: 351 U/L
ALT SERPL-CCNC: 218 U/L
ANION GAP SERPL CALC-SCNC: 4 MMOL/L (ref 0–18)
AST SERPL-CCNC: 560 U/L (ref 15–37)
BASOPHILS # BLD AUTO: 0 X10(3) UL (ref 0–0.2)
BASOPHILS NFR BLD AUTO: 0 %
BILIRUB SERPL-MCNC: 0.4 MG/DL (ref 0.1–2)
BUN BLD-MCNC: 11 MG/DL (ref 9–23)
CALCIUM BLD-MCNC: 8 MG/DL (ref 8.5–10.1)
CHLORIDE SERPL-SCNC: 100 MMOL/L (ref 98–112)
CK SERPL-CCNC: 170 U/L
CMV QN DNA PCR: NEGATIVE IU/ML
CO2 SERPL-SCNC: 27 MMOL/L (ref 21–32)
CREAT BLD-MCNC: 0.59 MG/DL
DEPRECATED HBV CORE AB SER IA-ACNC: 1404.9 NG/ML
EGFRCR SERPLBLD CKD-EPI 2021: 106 ML/MIN/1.73M2 (ref 60–?)
EOSINOPHIL # BLD AUTO: 0 X10(3) UL (ref 0–0.7)
EOSINOPHIL NFR BLD AUTO: 0 %
ERYTHROCYTE [DISTWIDTH] IN BLOOD BY AUTOMATED COUNT: 15.3 %
GLOBULIN PLAS-MCNC: 4.2 G/DL (ref 2.8–4.4)
GLUCOSE BLD-MCNC: 157 MG/DL (ref 70–99)
HCT VFR BLD AUTO: 31.9 %
HGB BLD-MCNC: 10.3 G/DL
IMM GRANULOCYTES # BLD AUTO: 0.03 X10(3) UL (ref 0–1)
IMM GRANULOCYTES NFR BLD: 1.3 %
LYMPHOCYTES # BLD AUTO: 0.31 X10(3) UL (ref 1–4)
LYMPHOCYTES NFR BLD AUTO: 13.4 %
MCH RBC QN AUTO: 26.4 PG (ref 26–34)
MCHC RBC AUTO-ENTMCNC: 32.3 G/DL (ref 31–37)
MCV RBC AUTO: 81.8 FL
MONOCYTES # BLD AUTO: 0.09 X10(3) UL (ref 0.1–1)
MONOCYTES NFR BLD AUTO: 3.9 %
NEUTROPHILS # BLD AUTO: 1.88 X10 (3) UL (ref 1.5–7.7)
NEUTROPHILS # BLD AUTO: 1.88 X10(3) UL (ref 1.5–7.7)
NEUTROPHILS NFR BLD AUTO: 81.4 %
OSMOLALITY SERPL CALC.SUM OF ELEC: 275 MOSM/KG (ref 275–295)
PLATELET # BLD AUTO: 147 10(3)UL (ref 150–450)
POTASSIUM SERPL-SCNC: 3.8 MMOL/L (ref 3.5–5.1)
PROT SERPL-MCNC: 6.8 G/DL (ref 6.4–8.2)
RBC # BLD AUTO: 3.9 X10(6)UL
SODIUM SERPL-SCNC: 131 MMOL/L (ref 136–145)
WBC # BLD AUTO: 2.3 X10(3) UL (ref 4–11)

## 2023-12-11 PROCEDURE — 99205 OFFICE O/P NEW HI 60 MIN: CPT | Performed by: INTERNAL MEDICINE

## 2023-12-11 RX ORDER — VALGANCICLOVIR 450 MG/1
450 TABLET, FILM COATED ORAL 2 TIMES DAILY
COMMUNITY
Start: 2023-12-06

## 2023-12-11 NOTE — PROGRESS NOTES
Education Record    Learner:  Patient    Disease / Diagnosis: Pancytopenia    Barriers / Limitations:  None   Comments:    Method:  Discussion   Comments:    General Topics:  Medication and Side effects and symptom management   Comments:    Outcome:  Shows understanding   Comments:    Here for new consult- pancytopenia. She is taking prednisone 10mg from Dr Marla Reynoso and a new prescription of valganciclovir for CMV from Dr. Delta Joseph. She feels very fatigued and dizzy at times. She feels hot at night and says she sweats a little bit. Her oral ulcers are improving and she can eat a little bit now.

## 2023-12-11 NOTE — PROGRESS NOTES
Hematology/Oncology Initial Consultation Note    Patient Name: Lb Clemons  Medical Record Number: [id number]    YOB: 1970   Date of Consultation: 2023   PCP: John Sandoval MD   Other providers:  Dr Emily Mast    Reason for Consultation:  Lb Clemons was seen today for the diagnosis of pancytopenia. History of Present Illness:      51 y/o F PMH SLE, CMV infection who presents for evalation of pancytopenia. Recently saw Dr Gordon Maher on 23, noted to have fever, chills, oral blisters. She was seen in immediate care before that, noted ot have WBC 1.5k, Hgb 11, plt 163, elevated LFTs with , , ., normal Tbili. Repeat labs on 23 noted WBC of 1.2 with ANC of 800, Hgb 11.1, plt 88k. She was started on prednisone 23 for SLE flare. Started on valganciclovir 900mg BID by Dr Jake Blanchard for CMV infection. CMV PCR 23 PCR was negative. Reports that mouth sores have significantly improved since starting treatment. Still having muscle weakness but slightly better as well. Minimal night sweats, otherwise no fevers. She ambulates without any aids. Seeing Dr Gordon Maher next Friday. Past Medical History:  Past Medical History:   Diagnosis Date    Anxiety     Since i fell sick    Arthritis     Blurred vision     Pain in joints     Relapsing polychondritis 2021    Systemic lupus erythematosus ([facility] [address] 2021    Wears glasses        No past surgical history on file. Home Medications:  No outpatient medications have been marked as taking for the 23 encounter (Appointment) with Murphy Perdue MD.     -------  Current Outpatient Medications on File Prior to Visit   Medication Sig Dispense Refill    predniSONE 20 MG Oral Tab Take 1 tablet (20 mg total) by mouth daily. 30 tablet 0    [] ibuprofen 600 MG Oral Tab Take 1 tablet (600 mg total) by mouth every 8 (eight) hours as needed for Pain or Fever.  21 tablet 0     Current Facility-Administered Medications on File Prior to Visit   Medication Dose Route Frequency Provider Last Rate Last Admin    [COMPLETED] sodium chloride 0.9% infusion 1,000 mL  1,000 mL Intravenous Once Xiomara Josue PA-C   Stopped at 11/19/23 1252       Allergies:    Allergies   Allergen Reactions    Acyclovir HIVES and PAIN    Amoxicillin HIVES    Pantoprazole DIARRHEA and HIVES       Psychosocial History:  Social History     Social History Narrative    Not on file     Social History     Socioeconomic History    Marital status:    Tobacco Use    Smoking status: Never    Smokeless tobacco: Never   Substance and Sexual Activity    Alcohol use: Never    Drug use: Never   Other Topics Concern    Caffeine Concern No    Exercise No    Seat Belt No    Special Diet Yes     Comment: Mostly vegan    Stress Concern No    Weight Concern No       Family Medical History:  Family History   Problem Relation Age of Onset    Stroke Father        Review of Systems:  A 10-point ROS was done with pertinent positives and negative per the HPI    Vital Signs:  Height: --  Weight: --  BSA (Calculated - sq m): --  Pulse: --  BP: --  Temp: --  Do Not Use - Resp Rate: --  SpO2: --    Wt Readings from Last 6 Encounters:   12/05/23 47.5 kg (104 lb 12.8 oz)   12/05/23 48.5 kg (107 lb)   11/22/23 48.1 kg (106 lb)   11/15/23 47.6 kg (105 lb)   09/14/23 47.6 kg (105 lb)   07/12/23 48.5 kg (107 lb)         Physical Examination:  General: Patient is alert and oriented, not in acute distress  Psych: Mood and affect are appropriate  Eyes: EOMI, PERRL  ENT: Mucositis under tongue and left side of cheek  CV: no LE edema  Respiratory: Nonlabored respirations  GI/Abd: Soft, non-tender, no hepatosplenomegaly  Neurological: Grossly intact   Lymphatics: No palpable cervical, supraclavicular, axillary, or inguinal lymphadenopathy  Skin: no rashes or petechiae    Laboratory:  Lab Results   Component Value Date    WBC 1.7 (L) 12/05/2023    WBC 1.2 (L) 12/01/2023    WBC 3.1 (L) 11/01/2021    HGB 11.2 (L) 12/05/2023    HGB 11.1 (L) 12/01/2023    HGB 12.0 11/01/2021    HCT 32.8 (L) 12/05/2023    MCV 79.4 (L) 12/05/2023    MCH 27.1 12/05/2023    MCHC 34.1 12/05/2023    RDW 14.5 12/05/2023    .0 (L) 12/05/2023    PLT 88.0 (L) 12/01/2023    .0 11/01/2021     Lab Results   Component Value Date     (H) 12/05/2023    BUN 7 (L) 12/05/2023    BUNCREA 15.2 06/25/2021    CREATSERUM 0.55 12/05/2023    CREATSERUM 0.44 (L) 12/01/2023    CREATSERUM 0.63 11/01/2021    ANIONGAP 4 12/05/2023    GFRNAA 103 11/01/2021    GFRAA 118 11/01/2021    CA 8.1 (L) 12/05/2023    OSMOCALC 280 12/05/2023    ALKPHO 547 (H) 12/06/2023     (H) 12/06/2023     (H) 12/06/2023    BILT 0.5 12/06/2023    TP 7.2 12/06/2023    ALB 2.7 (L) 12/06/2023    GLOBULIN 4.1 12/05/2023     (L) 12/05/2023    K 4.0 12/05/2023     12/05/2023    CO2 28.0 12/05/2023     Lab Results   Component Value Date    PTT 32.6 11/01/2021     Impression & Plan:     Pancytopenia  Secondary to CMV infection with positive CMV IgM. CMV PCR is negative; will repeat today, but pt's presentation fits with CMV infection with pancytopenia, hepatitis, possible myositis. - given clinical improvement, continue valganciclovir  - no bone marrow biopsy is warranted given improving blood counts; given pt is now on treatment, may not see CMV inclusions on bone marrow    CMV infection, acute  Continue valganciclovir; follow up with Dr Nicolás Liao    SLE  On 10mg prednisone daily; seeing Dr Aylin Bronson next week.     F/u as needed      Senthil Martínez MD  Hematology/Medical [facility]

## 2023-12-13 ENCOUNTER — PATIENT OUTREACH (OUTPATIENT)
Dept: INTERNAL MEDICINE CLINIC | Facility: HOSPITAL | Age: 53
End: 2023-12-13

## 2023-12-13 ENCOUNTER — LAB ENCOUNTER (OUTPATIENT)
Dept: LAB | Age: 53
End: 2023-12-13
Attending: INTERNAL MEDICINE
Payer: COMMERCIAL

## 2023-12-13 ENCOUNTER — HOSPITAL ENCOUNTER (OUTPATIENT)
Dept: CV DIAGNOSTICS | Age: 53
Discharge: HOME | End: 2023-12-13
Attending: INTERNAL MEDICINE
Payer: COMMERCIAL

## 2023-12-13 ENCOUNTER — TELEPHONE (OUTPATIENT)
Dept: RHEUMATOLOGY | Facility: CLINIC | Age: 53
End: 2023-12-13

## 2023-12-13 DIAGNOSIS — R01.1 HEART MURMUR: ICD-10-CM

## 2023-12-13 DIAGNOSIS — R79.89 ELEVATED LFTS: ICD-10-CM

## 2023-12-13 DIAGNOSIS — R77.8 LOW SERUM COMPLEMENT C3: ICD-10-CM

## 2023-12-13 DIAGNOSIS — R79.89 ELEVATED LFTS: Primary | ICD-10-CM

## 2023-12-13 LAB
ALBUMIN SERPL-MCNC: 2.8 G/DL (ref 3.4–5)
ALBUMIN/GLOB SERPL: 0.6 {RATIO} (ref 1–2)
ALP LIVER SERPL-CCNC: 275 U/L
ALT SERPL-CCNC: 184 U/L
ANION GAP SERPL CALC-SCNC: 9 MMOL/L (ref 0–18)
AST SERPL-CCNC: 408 U/L (ref 15–37)
BASOPHILS # BLD AUTO: 0 X10(3) UL (ref 0–0.2)
BASOPHILS NFR BLD AUTO: 0 %
BILIRUB SERPL-MCNC: 0.4 MG/DL (ref 0.1–2)
BUN BLD-MCNC: 10 MG/DL (ref 9–23)
CALCIUM BLD-MCNC: 8.3 MG/DL (ref 8.5–10.1)
CHLORIDE SERPL-SCNC: 99 MMOL/L (ref 98–112)
CMV QN DNA PCR: NEGATIVE IU/ML
CO2 SERPL-SCNC: 23 MMOL/L (ref 21–32)
CREAT BLD-MCNC: 0.45 MG/DL
EGFRCR SERPLBLD CKD-EPI 2021: 114 ML/MIN/1.73M2 (ref 60–?)
EOSINOPHIL # BLD AUTO: 0 X10(3) UL (ref 0–0.7)
EOSINOPHIL NFR BLD AUTO: 0 %
ERYTHROCYTE [DISTWIDTH] IN BLOOD BY AUTOMATED COUNT: 15.7 %
FASTING STATUS PATIENT QL REPORTED: NO
GLOBULIN PLAS-MCNC: 4.4 G/DL (ref 2.8–4.4)
GLUCOSE BLD-MCNC: 123 MG/DL (ref 70–99)
HCT VFR BLD AUTO: 31.3 %
HGB BLD-MCNC: 10.2 G/DL
IMM GRANULOCYTES # BLD AUTO: 0.02 X10(3) UL (ref 0–1)
IMM GRANULOCYTES NFR BLD: 1.2 %
LYMPHOCYTES # BLD AUTO: 0.21 X10(3) UL (ref 1–4)
LYMPHOCYTES NFR BLD AUTO: 13 %
MCH RBC QN AUTO: 26.7 PG (ref 26–34)
MCHC RBC AUTO-ENTMCNC: 32.6 G/DL (ref 31–37)
MCV RBC AUTO: 81.9 FL
MONOCYTES # BLD AUTO: 0.06 X10(3) UL (ref 0.1–1)
MONOCYTES NFR BLD AUTO: 3.7 %
NEUTROPHILS # BLD AUTO: 1.33 X10 (3) UL (ref 1.5–7.7)
NEUTROPHILS # BLD AUTO: 1.33 X10(3) UL (ref 1.5–7.7)
NEUTROPHILS NFR BLD AUTO: 82.1 %
OSMOLALITY SERPL CALC.SUM OF ELEC: 272 MOSM/KG (ref 275–295)
PLATELET # BLD AUTO: 165 10(3)UL (ref 150–450)
POTASSIUM SERPL-SCNC: 4.1 MMOL/L (ref 3.5–5.1)
PROT SERPL-MCNC: 7.2 G/DL (ref 6.4–8.2)
RBC # BLD AUTO: 3.82 X10(6)UL
SODIUM SERPL-SCNC: 131 MMOL/L (ref 136–145)
WBC # BLD AUTO: 1.6 X10(3) UL (ref 4–11)

## 2023-12-13 PROCEDURE — 93306 TTE W/DOPPLER COMPLETE: CPT | Performed by: INTERNAL MEDICINE

## 2023-12-13 PROCEDURE — 85025 COMPLETE CBC W/AUTO DIFF WBC: CPT

## 2023-12-13 PROCEDURE — 36415 COLL VENOUS BLD VENIPUNCTURE: CPT

## 2023-12-13 PROCEDURE — 80053 COMPREHEN METABOLIC PANEL: CPT

## 2023-12-13 NOTE — PROGRESS NOTES
Feeling better. Labs on 12/11 show improving LFTS. CMV DNA PCR, prior to initiation of anne ganciclovir was NOT detected.  Valganciclovir discontinued

## 2023-12-13 NOTE — TELEPHONE ENCOUNTER
Called pt, pt has been taking Valganciclovir for infection - but was told by Dr. Avril Hartmann that she does not have this and to stop taking the medication. Pt is still feeling sick, having some abdominal pain and would like to know what she should do next. Pt is requesting to speak to Dr. Ricarda Copeland when he has availability.

## 2023-12-18 ENCOUNTER — OFFICE VISIT (OUTPATIENT)
Dept: RHEUMATOLOGY | Facility: CLINIC | Age: 53
End: 2023-12-18
Payer: COMMERCIAL

## 2023-12-18 VITALS
RESPIRATION RATE: 16 BRPM | HEART RATE: 108 BPM | SYSTOLIC BLOOD PRESSURE: 92 MMHG | OXYGEN SATURATION: 99 % | BODY MASS INDEX: 20.05 KG/M2 | DIASTOLIC BLOOD PRESSURE: 58 MMHG | HEIGHT: 60 IN | WEIGHT: 102.13 LBS | TEMPERATURE: 100 F

## 2023-12-18 DIAGNOSIS — M32.9 SYSTEMIC LUPUS ERYTHEMATOSUS, UNSPECIFIED SLE TYPE, UNSPECIFIED ORGAN INVOLVEMENT STATUS (HCC): Primary | ICD-10-CM

## 2023-12-18 DIAGNOSIS — M60.9: ICD-10-CM

## 2023-12-18 DIAGNOSIS — Z79.52 LONG TERM CURRENT USE OF SYSTEMIC STEROIDS: ICD-10-CM

## 2023-12-18 DIAGNOSIS — M60.9 MYOSITIS OF BOTH LOWER LEGS: ICD-10-CM

## 2023-12-18 DIAGNOSIS — M85.80 OSTEOPENIA, UNSPECIFIED LOCATION: ICD-10-CM

## 2023-12-18 RX ORDER — MYCOPHENOLATE MOFETIL 500 MG/1
TABLET ORAL
Qty: 346 TABLET | Refills: 0 | Status: SHIPPED | OUTPATIENT
Start: 2023-12-18 | End: 2024-03-17

## 2023-12-18 RX ORDER — HYDROXYCHLOROQUINE SULFATE 200 MG/1
200 TABLET, FILM COATED ORAL DAILY
COMMUNITY

## 2023-12-18 NOTE — TELEPHONE ENCOUNTER
Late entry, called pt 12/14. Discussed neg CMV PCR. Likely lupus flare. She needs to see GI.  Try to increase prednisone to 20 mg daily if tolerated

## 2023-12-19 ENCOUNTER — PATIENT MESSAGE (OUTPATIENT)
Dept: RHEUMATOLOGY | Facility: CLINIC | Age: 53
End: 2023-12-19

## 2023-12-19 DIAGNOSIS — M32.9 SYSTEMIC LUPUS ERYTHEMATOSUS, UNSPECIFIED SLE TYPE, UNSPECIFIED ORGAN INVOLVEMENT STATUS (HCC): Primary | ICD-10-CM

## 2023-12-21 ENCOUNTER — TELEPHONE (OUTPATIENT)
Dept: RHEUMATOLOGY | Facility: CLINIC | Age: 53
End: 2023-12-21

## 2023-12-21 NOTE — TELEPHONE ENCOUNTER
Pt called with reaction to medication. Mycophenolate Mofetil 500 MG Oral Tab   Take 1 tablet (500 mg total) by mouth 2 (two) times daily for 7 days, THEN 2 tablets (1,000 mg total) 2 (two) times daily.     Pt states she started medication on 12/19/23 and had no reaction until yesterday 12/20 and today 12/21 getting worse.     She states she is very diaphoretic she \"is sweating so much\" and feeling body is shaky     Denies any trouble breathing, rash, swelling.   Instructed to contact PCP and head to ER/ call 911 if she experiences swelling of face/lips and or throat and or trouble breathing

## 2023-12-21 NOTE — TELEPHONE ENCOUNTER
Called pt back and she is feeling better and less shaky.   Shared the following istructions from Dr. Guajardo:    \"Stop MMF. Check temperature. Check for covid at home. If worse tomorrow, go to urgent care.\"    Temperature per pt was 97.8     Agrees to test for covid and will go to urgent care tomorrow if it gets worse.     Reminded pt it is very important to call 911 if she experiences swelling of face/lips and or throat and or trouble breathing

## 2023-12-26 ENCOUNTER — LAB ENCOUNTER (OUTPATIENT)
Dept: LAB | Age: 53
End: 2023-12-26
Attending: INTERNAL MEDICINE
Payer: COMMERCIAL

## 2023-12-26 DIAGNOSIS — M32.9 SYSTEMIC LUPUS ERYTHEMATOSUS, UNSPECIFIED SLE TYPE, UNSPECIFIED ORGAN INVOLVEMENT STATUS (HCC): ICD-10-CM

## 2023-12-26 DIAGNOSIS — M85.80 OSTEOPENIA, UNSPECIFIED LOCATION: ICD-10-CM

## 2023-12-26 DIAGNOSIS — Z79.52 LONG TERM CURRENT USE OF SYSTEMIC STEROIDS: ICD-10-CM

## 2023-12-26 LAB
ALBUMIN SERPL-MCNC: 2.7 G/DL (ref 3.4–5)
ALBUMIN/GLOB SERPL: 0.6 {RATIO} (ref 1–2)
ALP LIVER SERPL-CCNC: 109 U/L
ALT SERPL-CCNC: 47 U/L
ANION GAP SERPL CALC-SCNC: 3 MMOL/L (ref 0–18)
AST SERPL-CCNC: 39 U/L (ref 15–37)
BASOPHILS # BLD AUTO: 0 X10(3) UL (ref 0–0.2)
BASOPHILS NFR BLD AUTO: 0 %
BILIRUB SERPL-MCNC: 0.3 MG/DL (ref 0.1–2)
BUN BLD-MCNC: 8 MG/DL (ref 9–23)
CALCIUM BLD-MCNC: 8.5 MG/DL (ref 8.5–10.1)
CHLORIDE SERPL-SCNC: 105 MMOL/L (ref 98–112)
CK SERPL-CCNC: 41 U/L
CO2 SERPL-SCNC: 28 MMOL/L (ref 21–32)
CREAT BLD-MCNC: 0.48 MG/DL
CRP SERPL-MCNC: 8.6 MG/DL (ref ?–0.3)
EGFRCR SERPLBLD CKD-EPI 2021: 112 ML/MIN/1.73M2 (ref 60–?)
EOSINOPHIL # BLD AUTO: 0 X10(3) UL (ref 0–0.7)
EOSINOPHIL NFR BLD AUTO: 0 %
ERYTHROCYTE [DISTWIDTH] IN BLOOD BY AUTOMATED COUNT: 17.5 %
ERYTHROCYTE [SEDIMENTATION RATE] IN BLOOD: 106 MM/HR
FASTING STATUS PATIENT QL REPORTED: NO
GLOBULIN PLAS-MCNC: 4.6 G/DL (ref 2.8–4.4)
GLUCOSE BLD-MCNC: 126 MG/DL (ref 70–99)
HCT VFR BLD AUTO: 27.9 %
HGB BLD-MCNC: 9 G/DL
IMM GRANULOCYTES # BLD AUTO: 0.03 X10(3) UL (ref 0–1)
IMM GRANULOCYTES NFR BLD: 0.5 %
LYMPHOCYTES # BLD AUTO: 0.37 X10(3) UL (ref 1–4)
LYMPHOCYTES NFR BLD AUTO: 6 %
MAGNESIUM SERPL-MCNC: 2.3 MG/DL (ref 1.6–2.6)
MCH RBC QN AUTO: 27.4 PG (ref 26–34)
MCHC RBC AUTO-ENTMCNC: 32.3 G/DL (ref 31–37)
MCV RBC AUTO: 85.1 FL
MONOCYTES # BLD AUTO: 0.16 X10(3) UL (ref 0.1–1)
MONOCYTES NFR BLD AUTO: 2.6 %
NEUTROPHILS # BLD AUTO: 5.63 X10 (3) UL (ref 1.5–7.7)
NEUTROPHILS # BLD AUTO: 5.63 X10(3) UL (ref 1.5–7.7)
NEUTROPHILS NFR BLD AUTO: 90.9 %
OSMOLALITY SERPL CALC.SUM OF ELEC: 282 MOSM/KG (ref 275–295)
PHOSPHATE SERPL-MCNC: 4 MG/DL (ref 2.5–4.9)
PLATELET # BLD AUTO: 349 10(3)UL (ref 150–450)
POTASSIUM SERPL-SCNC: 4.1 MMOL/L (ref 3.5–5.1)
PROT SERPL-MCNC: 7.3 G/DL (ref 6.4–8.2)
RBC # BLD AUTO: 3.28 X10(6)UL
SODIUM SERPL-SCNC: 136 MMOL/L (ref 136–145)
VIT D+METAB SERPL-MCNC: 111.9 NG/ML (ref 30–100)
WBC # BLD AUTO: 6.2 X10(3) UL (ref 4–11)

## 2023-12-26 PROCEDURE — 82550 ASSAY OF CK (CPK): CPT | Performed by: INTERNAL MEDICINE

## 2023-12-26 PROCEDURE — 83735 ASSAY OF MAGNESIUM: CPT | Performed by: INTERNAL MEDICINE

## 2023-12-26 PROCEDURE — 84100 ASSAY OF PHOSPHORUS: CPT | Performed by: INTERNAL MEDICINE

## 2023-12-26 PROCEDURE — 86160 COMPLEMENT ANTIGEN: CPT | Performed by: INTERNAL MEDICINE

## 2023-12-26 PROCEDURE — 85652 RBC SED RATE AUTOMATED: CPT | Performed by: INTERNAL MEDICINE

## 2023-12-26 PROCEDURE — 85025 COMPLETE CBC W/AUTO DIFF WBC: CPT | Performed by: INTERNAL MEDICINE

## 2023-12-26 PROCEDURE — 80053 COMPREHEN METABOLIC PANEL: CPT | Performed by: INTERNAL MEDICINE

## 2023-12-26 PROCEDURE — 82306 VITAMIN D 25 HYDROXY: CPT | Performed by: INTERNAL MEDICINE

## 2023-12-26 PROCEDURE — 86140 C-REACTIVE PROTEIN: CPT | Performed by: INTERNAL MEDICINE

## 2023-12-26 NOTE — TELEPHONE ENCOUNTER
Background, Arethat 12/26/2023 4:21 PM CST    Hi Dr Brigid Hernandez, I think I am doing better, just having trouble sleeping at night. I did the lab work today, can we see if we can reduce the prednisone? I am not sure if it is what making unable to relax at night. Please let me know.      Thank you   Jae West

## 2023-12-27 LAB
C3 SERPL-MCNC: 80.3 MG/DL (ref 90–180)
C4 SERPL-MCNC: 19.1 MG/DL (ref 10–40)

## 2023-12-28 RX ORDER — MYCOPHENOLATE MOFETIL 250 MG/1
1000 CAPSULE ORAL
Qty: 720 CAPSULE | Refills: 0 | Status: SHIPPED | OUTPATIENT
Start: 2023-12-28 | End: 2024-03-27

## 2023-12-30 ENCOUNTER — TELEPHONE (OUTPATIENT)
Dept: RHEUMATOLOGY | Facility: CLINIC | Age: 53
End: 2023-12-30

## 2023-12-30 DIAGNOSIS — H53.8 BLURRED VISION, BILATERAL: Primary | ICD-10-CM

## 2023-12-30 NOTE — TELEPHONE ENCOUNTER
Pt called through answering service. States she has had some blurred vision in the right eye since last night with redness. Used her refresh drops last night and it got better but feels some sensitivity to light. Then at 12 today, she had similar symptoms but affecting both eyes. Denies eye pain  Reports slight headache over the forehead. Wondering if reaction to medication she is taking.   + dry mouth and sjogren's/lupus. Restarted MMF today but symptoms started yesterday. Hard to gather full history from pt over the phone. She has not reached out to ophthalmology yet. Strongly recommended she reach out to ophthalmology to see if she can be seen urgently in office today. Discussed that there are inflammatory eye conditions that can be associated with lupus. She is concerned about MMF, so I told her to hold her medication until her appt with Dr. Salo Patel on 01/03. In the meantime, recommended she continue the prednisone and hydroxychloroquine. She was instructed to use refresh up to 4x daily and to use warm compresses. Also recommended she check her blood pressure. If symptoms worsen, encouraged she be evaluated in ER. She will keep me updated. Patient verbalized understanding of above instructions. No further questions at this time. I am covering for Dr. Salo Patel while he is out of the office. (Today is Saturday and office closed). Time spent: 15min on the phone explaining above.        Oscar Newton DO  [facility]  12/30/2023

## 2023-12-31 DIAGNOSIS — M32.9 SYSTEMIC LUPUS ERYTHEMATOSUS, UNSPECIFIED SLE TYPE, UNSPECIFIED ORGAN INVOLVEMENT STATUS (HCC): ICD-10-CM

## 2023-12-31 NOTE — TELEPHONE ENCOUNTER
Called pt to get update. States redness and eye pain better. Still with slight headache and intermittent blurred vision. Called ophtho office yesterday and was told could be ocular migraine and recommended tylenol. Pt has been taking prn and feeling improved overall. Encouraged pt to reach out if symptoms worsen. Still encouraged OTC Refresh drops BID for dryness. Will endorse to Dr. Marla Reynoso when back in office. Reminded pt of her appt with him on 01/03    Patient verbalized understanding of above instructions. No further questions at this time.     Ren Martinez DO  [facility]  12/31/2023

## 2024-01-02 NOTE — TELEPHONE ENCOUNTER
LOV:    12/18/2023           Future Appointments   Date Time Provider Department Center   1/3/2024 12:30 PM Edis Guajardo MD [facility]   1/8/2024  9:00 AM Sarah Parish Emmie, APRN [facility]   3/22/2024 10:30 AM Edis Guajardo MD [facility]       LF:  12/04/2023       QTY:   30      Refills:     0       LABS:       Component      Latest Ref Rng 12/26/2023   WBC      4.0 - 11.0 x10(3) uL 6.2    RBC      3.80 - 5.30 x10(6)uL 3.28 (L)    Hemoglobin      12.0 - 16.0 g/dL 9.0 (L)    Hematocrit      35.0 - 48.0 % 27.9 (L)    Platelet Count      150.0 - 450.0 10(3)uL 349.0    MCV      80.0 - 100.0 fL 85.1    MCH      26.0 - 34.0 pg 27.4    MCHC      31.0 - 37.0 g/dL 32.3    RDW      % 17.5    Prelim Neutrophil Abs      1.50 - 7.70 x10 (3) uL 5.63    Neutrophils Absolute      1.50 - 7.70 x10(3) uL 5.63    Lymphocytes Absolute      1.00 - 4.00 x10(3) uL 0.37 (L)    Monocytes Absolute      0.10 - 1.00 x10(3) uL 0.16    Eosinophils Absolute      0.00 - 0.70 x10(3) uL 0.00    Basophils Absolute      0.00 - 0.20 x10(3) uL 0.00    Immature Granulocyte Absolute      0.00 - 1.00 x10(3) uL 0.03    Neutrophils %      % 90.9    Lymphocytes %      % 6.0    Monocytes %      % 2.6    Eosinophils %      % 0.0    Basophils %      % 0.0    Immature Granulocyte %      % 0.5    Glucose      70 - 99 mg/dL 126 (H)    Sodium      136 - 145 mmol/L 136    Potassium      3.5 - 5.1 mmol/L 4.1    Chloride      98 - 112 mmol/L 105    Carbon Dioxide, Total      21.0 - 32.0 mmol/L 28.0    ANION GAP      0 - 18 mmol/L 3    BUN      9 - 23 mg/dL 8 (L)    CREATININE      0.55 - 1.02 mg/dL 0.48 (L)    CALCIUM      8.5 - 10.1 mg/dL 8.5    CALCULATED OSMOLALITY      275 - 295 mOsm/kg 282    EGFR      >=60 mL/min/1.73m2 112    AST (SGOT)      15 - 37 U/L 39 (H)    ALT (SGPT)      13 - 56 U/L 47    ALKALINE PHOSPHATASE      41 - 108 U/L 109 (H)    Total Bilirubin      0.1 - 2.0 mg/dL 0.3    PROTEIN, TOTAL      6.4 - 8.2 g/dL  7.3    Albumin      3.4 - 5.0 g/dL 2.7 (L)    Globulin      2.8 - 4.4 g/dL 4.6 (H)    A/G Ratio      1.0 - 2.0  0.6 (L)    Patient Fasting for CMP? No       Legend:  (L) Low  (H) High

## 2024-01-03 ENCOUNTER — OFFICE VISIT (OUTPATIENT)
Dept: RHEUMATOLOGY | Facility: CLINIC | Age: 54
End: 2024-01-03
Payer: COMMERCIAL

## 2024-01-03 VITALS
TEMPERATURE: 98 F | HEIGHT: 60 IN | WEIGHT: 104.38 LBS | RESPIRATION RATE: 16 BRPM | HEART RATE: 104 BPM | SYSTOLIC BLOOD PRESSURE: 98 MMHG | DIASTOLIC BLOOD PRESSURE: 50 MMHG | OXYGEN SATURATION: 98 % | BODY MASS INDEX: 20.49 KG/M2

## 2024-01-03 DIAGNOSIS — Z51.81 THERAPEUTIC DRUG MONITORING: ICD-10-CM

## 2024-01-03 DIAGNOSIS — M32.9 SYSTEMIC LUPUS ERYTHEMATOSUS, UNSPECIFIED SLE TYPE, UNSPECIFIED ORGAN INVOLVEMENT STATUS (HCC): Primary | ICD-10-CM

## 2024-01-03 PROCEDURE — 3008F BODY MASS INDEX DOCD: CPT | Performed by: INTERNAL MEDICINE

## 2024-01-03 PROCEDURE — 3078F DIAST BP <80 MM HG: CPT | Performed by: INTERNAL MEDICINE

## 2024-01-03 PROCEDURE — 99215 OFFICE O/P EST HI 40 MIN: CPT | Performed by: INTERNAL MEDICINE

## 2024-01-03 PROCEDURE — 3074F SYST BP LT 130 MM HG: CPT | Performed by: INTERNAL MEDICINE

## 2024-01-03 RX ORDER — PREDNISONE 2.5 MG/1
TABLET ORAL
Qty: 151 TABLET | Refills: 0 | Status: SHIPPED | OUTPATIENT
Start: 2024-01-03 | End: 2024-03-22

## 2024-01-03 RX ORDER — PREDNISONE 20 MG/1
20 TABLET ORAL DAILY
Qty: 30 TABLET | Refills: 0 | OUTPATIENT
Start: 2024-01-03

## 2024-01-03 NOTE — PATIENT INSTRUCTIONS
Repeat blood work at the end of Jan 2024    Increase hydroxychloroquine (plaquenil) to 2 pills daily in February if tolerated. Can decrease to 1 pill in 6 months    Increase Cellcept(mycophenolate) 250 mg pills every 5-7 days as tolerated:  -to two pills twice daily   -to three pills twice daily and stay there     Prednisone:   Take 4 tablets (10 mg total) by mouth daily for 7 days  THEN 3 tablets (7.5 mg total) daily for 14 days  THEN 2 tablets (5 mg total) daily for 30 days  THEN 1 tablet (2.5 mg total) daily for 14 days  THEN 1 tablet (2.5 mg total) every other day for 14 days.,     Sjogren's due to lupus (secondary Sjogren's) Plan:    Dry eyes, try OTC eye drops: Systane, Refresh or Blink. Preservative-free (individually packaged) are the best as preservatives can dry the eyes out.     Dry Mouth Remedies: https://www.sjogrens.org/member-community/product-directory/products-for-dry-mouth   Highlights:   Dry Mouth Lozenges/tabs: ACT Dry mouth lozenges, XyliMelts,TheraMints, MIGHTEAFLOW Dry Mouth Chewing Gum/Lozenges  Dry Mouth Spray/Gels: Gels may be very useful at night to prevent nighttime \"cottonmouth\". 3M™ Xerostomia Relief Spray, Biotène® Moisturizing Mouth Spray/Gel/Liquid  Mouthwash/Toothpaste: Biotene, ACT products    Consider joining Sjogren's Foundation for brochures and resource guides.  There is an online community as well to discuss with others with Sjogren's. https://www.sjogrens.org/member-community    Another great site for product recommendation from [facility] Sjogren's Website: https://www.health.org/sjogrens-syndrome-clinic/tips-to-managing-[email or web address]/38214

## 2024-01-03 NOTE — PROGRESS NOTES
Rheumatology f/u Patient Note  =====================================================================================================  ?  Chief Complaint:   SLE    Chief Complaint   Patient presents with    Follow - Up     LOV 12/18/2023. Pt states she is feeling better.      PCP  Tye Xie MD  Fax: [phone]  Phone: [phone]    =====================================================================================================  HPI 9/2021 HPI    Arturo Ulloa is a 53 year old female     Here for evaluation of relapsing polychondritis and SLE  USH until:  October 2020 and Feb 2021: developed chondritis in left ear. 1st episode given steroids and abx, this helped. 2nd episode: given abx only, which didn't help. Had tinnitus as well. Eventually, given prednisone 20 mg daily x 10 days and aleve by ENT. No antecedent supplements or drugs.  Sometimes takes aleve prn for pain/flaring of ears. Saw Dr. Sales (ENT at [facility] [place]), diagnosed with RPC, Audio: Bilateral mild high frequency SNHL, normal tympanograms  Also with stiffness in the hands a few months later, also with intermittent rashes in back. Photosensitive fatigue that is new. +Dry eye, feels dry and itching recently; saw ophtho ([facility], Dr. Ye) recently; no issues in eyes.   Feels more mucous in the airway and irritation in the airway recently.   Currently ear is 75% better from peak of inflammation. Has echocardiogram ordered by PCP office. Not completed yet.   2 kids:   +miscarriages: 2x. 10 or 12 weeks.   Denies current alopecia, malar rash, discoid lesions, oral/nasal ulcers, pleuritic chest pain, arthritis, seizures/psychosis, Raynaud's, dry mouth,or blood clots.  ==============================================================================================================  Today's Visit: 01/03/24    Doing much better.  Here with  taper down the prednisone 10 mg daily 1 week ago.  ?ocular migraines per ophtho. Took APAP  which helped. Using artificial tears which helped.    Could not tolerate 500 mg capsule of MMF, now taking 250 mg of MMF.  Now taking 250 mg twice daily    More active now, doing Taichi. Muscles much less achy.  -Oral ulcers improved.  Hand rash is improved.    5 point ROS negative except noted above  I had reviewed past medical and family histories together with allergy and medication lists documented.    Medications:  Outpatient Medications Marked as Taking for the 1/3/24 encounter (Office Visit) with Edis Guajardo MD   Medication Sig Dispense Refill    predniSONE 2.5 MG Oral Tab Take 4 tablets (10 mg total) by mouth daily for 7 days, THEN 3 tablets (7.5 mg total) daily for 14 days, THEN 2 tablets (5 mg total) daily for 30 days, THEN 1 tablet (2.5 mg total) daily for 14 days, THEN 1 tablet (2.5 mg total) every other day for 14 days. 151 tablet 0    mycophenolate mofetil (CELLCEPT) 250 MG Oral Cap Take 4 capsules (1,000 mg total) by mouth 2 (two) times daily before meals. 720 capsule 0    hydroxychloroquine 200 MG Oral Tab Take 1 tablet (200 mg total) by mouth daily.      predniSONE 20 MG Oral Tab Take 1 tablet (20 mg total) by mouth daily. (Patient taking differently: Take 0.5 tablets (10 mg total) by mouth daily.) 30 tablet 0     Modified Medications    No medications on file     Medications Discontinued During This Encounter   Medication Reason    Mycophenolate Mofetil 500 MG Oral Tab     naproxen 500 MG Oral Tab      ?  Allergies:  Allergies   Allergen Reactions    Acyclovir HIVES and PAIN    Amoxicillin HIVES    Pantoprazole DIARRHEA and HIVES       Objective    Vitals:    01/03/24 1235   BP: 98/50   Pulse: 104   Resp: 16   Temp: 98.4 °F (36.9 °C)   SpO2: 98%   Weight: 104 lb 6.4 oz (47.4 kg)   Height: 5' (1.524 m)   GEN: NAD, well-nourished.   HEENT: Head: NCAT. Face: No lesions. Eyes: Conjunctiva clear.   CV: RRR  PULM: CTAB  Extremities: No cyanosis, edema or deformities.   Neurologic: Strength, CN2-12  grossly intact  -5/5 deltoid strength skin: No vasculitis in hands noted today  MSK: 28 joint count performed. No evidence of synovitis in mcp, pip, dip, wrist, elbows, shoulders, hips, knees, ankles, mtp unless otherwise noted. Full ROM of elbows, wrists, knees.         Labs:  Lab Results   Component Value Date    CK 41 12/26/2023     12/11/2023     (H) 12/06/2023     (H) 12/05/2023     (H) 12/01/2023       Lab Results   Component Value Date    AST 39 (H) 12/26/2023     (H) 12/13/2023     (H) 12/11/2023     (H) 12/06/2023    AST 1,057 (H) 12/05/2023     (H) 12/01/2023    AST 33 11/01/2021    AST 20 06/25/2021    ALT 47 12/26/2023     (H) 12/13/2023     (H) 12/11/2023     (H) 12/06/2023     (H) 12/05/2023     (H) 12/01/2023    ALT 41 11/01/2021    ALT 23 06/25/2021         Lab Results   Component Value Date    WBC 6.2 12/26/2023    RBC 3.28 (L) 12/26/2023    HGB 9.0 (L) 12/26/2023    HCT 27.9 (L) 12/26/2023    .0 12/26/2023    MCV 85.1 12/26/2023    MCH 27.4 12/26/2023    MCHC 32.3 12/26/2023    RDW 17.5 12/26/2023    NEPRELIM 5.63 12/26/2023    NEPERCENT 90.9 12/26/2023    LYPERCENT 6.0 12/26/2023    MOPERCENT 2.6 12/26/2023    EOPERCENT 0.0 12/26/2023    BAPERCENT 0.0 12/26/2023    NE 5.63 12/26/2023    LYMABS 0.37 (L) 12/26/2023    MOABSO 0.16 12/26/2023    EOABSO 0.00 12/26/2023    BAABSO 0.00 12/26/2023     Lab Results   Component Value Date     (H) 12/26/2023    BUN 8 (L) 12/26/2023    BUNCREA 15.2 06/25/2021    CREATSERUM 0.48 (L) 12/26/2023    ANIONGAP 3 12/26/2023    GFRNAA 103 11/01/2021    GFRAA 118 11/01/2021    CA 8.5 12/26/2023    OSMOCALC 282 12/26/2023    ALKPHO 109 (H) 12/26/2023    AST 39 (H) 12/26/2023    ALT 47 12/26/2023    BILT 0.3 12/26/2023    TP 7.3 12/26/2023    ALB 2.7 (L) 12/26/2023    GLOBULIN 4.6 (H) 12/26/2023     12/26/2023    K 4.1 12/26/2023     12/26/2023    CO2 28.0  12/26/2023     Lab Results   Component Value Date    MESFIN 1,404.9 (H) 12/11/2023    MESFIN 2,574.4 (H) 12/05/2023    MESFIN 3,385.8 (H) 12/01/2023 11/2023  Creatinine 0.50, rest of BMP WNL  WBC 1.5, hemoglobin 11.0  U/a WNL    10/2022  UPCR less than 4/14.02  WBC 2.16, rest of CBC WNL  dsDNA greater than 300, SSA 7.1, chromatin 8.0  CK WNL  PEDRITO 1: 1280 homogenous  C3 59, C4 15    Lab Results   Component Value Date    ANAS Positive (A) 06/25/2021     Lab Results   Component Value Date     (H) 06/25/2021     (H) 06/25/2021     Lab Results   Component Value Date    DSDNA 394 (H) 06/25/2021    SMUD <100 06/25/2021    RNP <100 06/25/2021     Lab Results   Component Value Date    SCL70 <100 06/25/2021     Lab Results   Component Value Date    C3 80.3 (L) 12/26/2023    C4 19.1 12/26/2023     Lab Results   Component Value Date    DRVVT Negative 11/01/2021    [id number] 1.2 11/01/2021    [id number] <2.9 11/01/2021     Lab Results   Component Value Date    CARDIOLIPIGG 4.2 11/01/2021    CARDIOLIPIGM 4.1 11/01/2021 11/2021  C3 71.4, C4 19  CRP less than 0.29  Sed rate 39   lupus anticoagulant, anticardiolipin IgG/IgM, beta-2 glycoprotein IgG/IgM negative  RF negative  MPO 45    2021 labs  PEDRITO positive    SSB: 112  dsDNA 394  Histone 272  WBC 2.8, hemoglobin 12.2, platelets 200  Vitamin D 34.2    Radiology:    12/2023 TTE normal    10/2023 DEXA  LUMBAR SPINE ANALYSIS RESULTS:      Bone mineral density (BMD) (g/cm2):  41.71    Lumbar T-Score:  0.860      % young normals:  82      % age matched controls:  93      Change from prior spine examination:  n/a               TOTAL HIP ANALYSIS RESULTS:        Bone mineral density (BMD) (g/cm2):  0.599      Total Hip T-Score:  -1.2      % young normals:  82      % age matched controls:  113      Change from prior hip examination:  n/a               FEMORAL NECK ANALYSIS RESULTS:        Bone mineral density (BMD) (g/cm2):  0.736      Femoral neck T-Score:   -1.0      % young normals:  87      % age matched controls:  101      Change from prior hip examination:  n/a            ADDITIONAL FINDINGS:  No significant additional findings.  Ten year fracture risk for major osteoporotic fracture is 2.8% and for hip fracture 0.1%.     4/2021 MRI TMJ  IMPRESSION:   1.  Grossly normal left TMJ.   2.  Mild to moderate right TMJ chronic degenerative changes, with diminutive/chronically   degenerated/frayed disc that exhibits anterior displacement with probable PARTIAL reduction.   Associated stretched and/or partially torn retrodiscal soft tissues.   Please see above for details and additional information.    4/2021 MRI IAC  IMPRESSION:   Essentially normal contrast-enhanced MRI of the IACs.   Radiology review:      =====================================================================================================  Assessment and Plan    Assessment:  1. Systemic lupus erythematosus, unspecified SLE type, unspecified organ involvement status (HCC)    2. Therapeutic drug monitoring        #SLE: diagnosed 2021.   -Clinical manifestations: Chondritis, arthralgia/synovitis, dry eye, photosensitive fatigue, rash, oral ulcers, miscarriage x 2 (>10 weeks).   -Serologies/labs: Positive PEDRITO, + SSA, + SSB, + dsDNA, + histone, leukopenia/lymphopenia, low C3, +MPO (likely from SLE)  -Recent lupus flare in late 2023 with the following:  -Worsening oral ulcers, low-grade temperatures, fatigue,   -Pancytopenia, particularly neutropenia and thrombocytopenia noted.  Likely due to SLE  -Mixed hepatocellular/cholestatic injury with significant elevation in AST  -Elevated CK and myopathy, myalgia/myopathy pointing to myositis.   -Overall improving significantly from a clinical and biochemical standpoint with prednisone taper and initiation of hydroxychloroquine + MMF.    The following in italics were not discussed today:  #Recurrent chondritis. she has had 2 episodes (in October 2020 and in February  2021) that is mostly resolved with corticosteroid therapy. Has seen Dr. Sales at [place]. Some response to NSAIDs.  Audiogram with mild high-frequency SNHL. MRI IAC wnl. Previously seen by ophthalmology ([facility], Dr. Ye); no evidence of scleritis/keratitis/uveitis.  -HRCT in 11/2021 without tracheitis or ILD  # Positive CMV IgM: Negative CMV PCR.  Thought to be a false positive per ID.  Now stopped valganciclovir.    Plan:  Long discussion today regarding the pathogenesis and prognosis of SLE.  She had a moderate/severe flare with moderate neutropenia and very high AST/ALT indicating lupoid hepatitis, high CK indicating lupus myositis.  Need to treat aggressively and needs long-term therapy.  Patient very worried about medications and side effects.  She wants to get off steroids immediately.  Provided realistic expectations about treatment.  Need to slowly taper off prednisone.  If she were to suffer another flare of lupus, we may need more aggressive therapy going forward.  Provided recommendations below, more aggressive prednisone taper than usual given the patient's difficulty tolerating prednisone and her personal desire to rapidly come off the medication.    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    Repeat SLE blood work at the end of Jan 2024    Increase hydroxychloroquine (plaquenil) to 2 pills daily in February if tolerated. Can decrease to 1 pill in 6 months    Targeted lower dose of 1500 mg daily given low body weight and high risk for increased risk of infection.  Increase Cellcept(mycophenolate) 250 mg pills every 5-7 days as tolerated:  -to two pills twice daily   -to three pills twice daily and stay there     Prednisone:   Take 4 tablets (10 mg total) by mouth daily for 7 days  THEN 3 tablets (7.5 mg total) daily for 14 days  THEN 2 tablets (5 mg total) daily for 30 days  THEN 1 tablet (2.5 mg total) daily for 14 days  THEN 1 tablet (2.5 mg total) every other day for 14 days.,      Sjogren's due to lupus (secondary Sjogren's) Plan:    Dry eyes, try OTC eye drops: Systane, Refresh or Blink. Preservative-free (individually packaged) are the best as preservatives can dry the eyes out.     Dry Mouth Remedies: https://[email or web address]-[email or web address]-[email or web address]/products-[email or web address]-dry-mouth   Highlights:   Dry Mouth Lozenges/tabs: ACT Dry mouth lozenges, XyliMelts,TheraMints, MIGHTEAFLOW Dry Mouth Chewing Gum/Lozenges  Dry Mouth Spray/Gels: Gels may be very useful at night to prevent nighttime \"cottonmouth\". 3M™ Xerostomia Relief Spray, Biotène® Moisturizing Mouth Spray/Gel/Liquid  Mouthwash/Toothpaste: Biotene, ACT products    Consider joining Sjogren's Foundation for brochures and resource guides.  There is an online community as well to discuss with others with Sjogren's. [email or web address].[email or web address]-community    Another great site for product recommendation from [facility] Sjogren's Website: [email or web address]-[email or web address]/tips-[email or web address]-[email or web address]-[email or web address]-[email or web address]    Rtc 4 weeks      A total of 50 minutes were spent face-to-face with the patient during this encounter and over half of that time was spent on counseling and coordination of care.      Diagnoses and all orders for this visit:    Systemic lupus erythematosus, unspecified SLE type, unspecified organ involvement status (HCC)  -     Comp Metabolic Panel (14); Future  -     Complement C3, Serum; Future  -     Complement C4, Serum; Future  -     CBC With Differential With Platelet; Future  -     Sed Rate, Westergren (Automated); Future  -     C-Reactive Protein; Future  -     CK (Creatine Kinase) (Not Creatinine); Future  -     Ferritin; Future  -     predniSONE 2.5 MG Oral Tab; Take 4 tablets (10 mg total) by mouth daily for 7 days, THEN 3 tablets (7.5 mg total) daily for 14 days, THEN 2 tablets (5 mg total) daily for 30 days, THEN 1 tablet (2.5 mg total) daily for 14 days, THEN 1 tablet (2.5 mg total) every  other day for 14 days.    Therapeutic drug monitoring  -     Comp Metabolic Panel (14); Future  -     Complement C3, Serum; Future  -     Complement C4, Serum; Future  -     CBC With Differential With Platelet; Future  -     Sed Rate, Westergren (Automated); Future  -     C-Reactive Protein; Future  -     CK (Creatine Kinase) (Not Creatinine); Future  -     Ferritin; Future  -     predniSONE 2.5 MG Oral Tab; Take 4 tablets (10 mg total) by mouth daily for 7 days, THEN 3 tablets (7.5 mg total) daily for 14 days, THEN 2 tablets (5 mg total) daily for 30 days, THEN 1 tablet (2.5 mg total) daily for 14 days, THEN 1 tablet (2.5 mg total) every other day for 14 days.            Return in about 1 month (around 2/5/2024).      The above plan of care, diagnosis, orders, and follow-up were discussed with the patient. Questions related to this recommended plan of care were answered.    Thank you for referring this delightful patient to me. Please feel free to contact me with any questions.     This report was performed utilizing speech recognition software technology. Despite proofreading, speech recognition errors could escape detection. If a word or phrase is confusing or out of context, please do not hesitate to call for   clarification.       Kind regards      Edis Guajardo MD  [facility]

## 2024-01-11 ENCOUNTER — LAB ENCOUNTER (OUTPATIENT)
Dept: LAB | Age: 54
End: 2024-01-11
Payer: COMMERCIAL

## 2024-01-11 DIAGNOSIS — R74.8 ELEVATED LIVER ENZYMES: ICD-10-CM

## 2024-01-11 LAB
A1AT SERPL-MCNC: 179 MG/DL (ref 90–200)
ALBUMIN SERPL-MCNC: 3.1 G/DL (ref 3.4–5)
ALBUMIN/GLOB SERPL: 0.6 {RATIO} (ref 1–2)
ALP LIVER SERPL-CCNC: 77 U/L
ALT SERPL-CCNC: 25 U/L
ANION GAP SERPL CALC-SCNC: 1 MMOL/L (ref 0–18)
AST SERPL-CCNC: 25 U/L (ref 15–37)
BILIRUB SERPL-MCNC: 0.3 MG/DL (ref 0.1–2)
BUN BLD-MCNC: 5 MG/DL (ref 9–23)
CALCIUM BLD-MCNC: 8.9 MG/DL (ref 8.5–10.1)
CERULOPLASMIN SERPL-MCNC: 32.2 MG/DL (ref 20–60)
CHLORIDE SERPL-SCNC: 108 MMOL/L (ref 98–112)
CO2 SERPL-SCNC: 30 MMOL/L (ref 21–32)
CREAT BLD-MCNC: 0.49 MG/DL
DEPRECATED HBV CORE AB SER IA-ACNC: 368 NG/ML
EGFRCR SERPLBLD CKD-EPI 2021: 111 ML/MIN/1.73M2 (ref 60–?)
FASTING STATUS PATIENT QL REPORTED: YES
GLOBULIN PLAS-MCNC: 4.8 G/DL (ref 2.8–4.4)
GLUCOSE BLD-MCNC: 83 MG/DL (ref 70–99)
HAV AB SER QL IA: REACTIVE
HAV IGM SER QL: NONREACTIVE
HBV CORE AB SERPL QL IA: REACTIVE
HBV SURFACE AB SER QL: NONREACTIVE
HBV SURFACE AB SERPL IA-ACNC: 9.49 MIU/ML
IRON SATN MFR SERPL: 17 %
IRON SERPL-MCNC: 53 UG/DL
OSMOLALITY SERPL CALC.SUM OF ELEC: 284 MOSM/KG (ref 275–295)
POTASSIUM SERPL-SCNC: 4 MMOL/L (ref 3.5–5.1)
PROT SERPL-MCNC: 7.9 G/DL (ref 6.4–8.2)
SODIUM SERPL-SCNC: 139 MMOL/L (ref 136–145)
TIBC SERPL-MCNC: 308 UG/DL (ref 240–450)
TRANSFERRIN SERPL-MCNC: 207 MG/DL (ref 200–360)

## 2024-01-11 PROCEDURE — 36415 COLL VENOUS BLD VENIPUNCTURE: CPT

## 2024-01-11 PROCEDURE — 86706 HEP B SURFACE ANTIBODY: CPT

## 2024-01-11 PROCEDURE — 80053 COMPREHEN METABOLIC PANEL: CPT

## 2024-01-11 PROCEDURE — 86709 HEPATITIS A IGM ANTIBODY: CPT

## 2024-01-11 PROCEDURE — 82390 ASSAY OF CERULOPLASMIN: CPT

## 2024-01-11 PROCEDURE — 82728 ASSAY OF FERRITIN: CPT

## 2024-01-11 PROCEDURE — 82103 ALPHA-1-ANTITRYPSIN TOTAL: CPT

## 2024-01-11 PROCEDURE — 83550 IRON BINDING TEST: CPT

## 2024-01-11 PROCEDURE — 86704 HEP B CORE ANTIBODY TOTAL: CPT

## 2024-01-11 PROCEDURE — 83540 ASSAY OF IRON: CPT

## 2024-01-11 PROCEDURE — 86708 HEPATITIS A ANTIBODY: CPT

## 2024-01-19 ENCOUNTER — HOSPITAL ENCOUNTER (OUTPATIENT)
Dept: CT IMAGING | Facility: HOSPITAL | Age: 54
Discharge: HOME | End: 2024-01-19
Payer: COMMERCIAL

## 2024-01-19 DIAGNOSIS — R10.31 RIGHT LOWER QUADRANT ABDOMINAL PAIN: ICD-10-CM

## 2024-01-19 DIAGNOSIS — R63.4 WEIGHT LOSS: ICD-10-CM

## 2024-01-19 PROCEDURE — 74177 CT ABD & PELVIS W/CONTRAST: CPT

## 2024-01-19 RX ORDER — IOHEXOL 350 MG/ML
80 INJECTION, SOLUTION INTRAVENOUS
Status: COMPLETED | OUTPATIENT
Start: 2024-01-19 | End: 2024-01-19

## 2024-01-19 RX ADMIN — IOHEXOL 80 ML: 350 INJECTION, SOLUTION INTRAVENOUS at 13:07:00

## 2024-01-29 ENCOUNTER — TELEPHONE (OUTPATIENT)
Dept: RHEUMATOLOGY | Facility: CLINIC | Age: 54
End: 2024-01-29

## 2024-01-29 ENCOUNTER — LAB ENCOUNTER (OUTPATIENT)
Dept: LAB | Age: 54
End: 2024-01-29
Payer: COMMERCIAL

## 2024-01-29 DIAGNOSIS — M32.9 SYSTEMIC LUPUS ERYTHEMATOSUS, UNSPECIFIED SLE TYPE, UNSPECIFIED ORGAN INVOLVEMENT STATUS (HCC): Primary | ICD-10-CM

## 2024-01-29 DIAGNOSIS — K12.1 ORAL ULCER: ICD-10-CM

## 2024-01-29 DIAGNOSIS — R74.8 ELEVATED LIVER ENZYMES: ICD-10-CM

## 2024-01-29 PROCEDURE — 87517 HEPATITIS B DNA QUANT: CPT

## 2024-01-29 PROCEDURE — 36415 COLL VENOUS BLD VENIPUNCTURE: CPT

## 2024-01-29 NOTE — TELEPHONE ENCOUNTER
Pt called office, pt has having night sweats and states gums are tender and sore. Sensitive with eating. Feels she may have mouth sores. Denies bleeding.

## 2024-01-29 NOTE — TELEPHONE ENCOUNTER
Please call patient back.  What dose of prednisone and CellCept she currently taking?     Trial decadron solution. Decadron oral solution: One (1) teaspoon (5 ml) , four (4) times daily (morning, half hour before each meal and at bedtime) for Canker sore pain. Swish in mouth, gargle and spit. Duration: Use for 7 days

## 2024-01-31 ENCOUNTER — TELEPHONE (OUTPATIENT)
Dept: RHEUMATOLOGY | Facility: CLINIC | Age: 54
End: 2024-01-31

## 2024-02-03 ENCOUNTER — LABORATORY ENCOUNTER (OUTPATIENT)
Dept: LAB | Age: 54
End: 2024-02-03
Attending: INTERNAL MEDICINE
Payer: COMMERCIAL

## 2024-02-03 DIAGNOSIS — Z51.81 THERAPEUTIC DRUG MONITORING: ICD-10-CM

## 2024-02-03 DIAGNOSIS — M32.9 SYSTEMIC LUPUS ERYTHEMATOSUS, UNSPECIFIED SLE TYPE, UNSPECIFIED ORGAN INVOLVEMENT STATUS (HCC): ICD-10-CM

## 2024-02-03 LAB
ALBUMIN SERPL-MCNC: 3.4 G/DL (ref 3.4–5)
ALBUMIN/GLOB SERPL: 0.8 {RATIO} (ref 1–2)
ALP LIVER SERPL-CCNC: 64 U/L
ALT SERPL-CCNC: 18 U/L
ANION GAP SERPL CALC-SCNC: 1 MMOL/L (ref 0–18)
AST SERPL-CCNC: 28 U/L (ref 15–37)
BASOPHILS # BLD AUTO: 0.01 X10(3) UL (ref 0–0.2)
BASOPHILS NFR BLD AUTO: 0.3 %
BILIRUB SERPL-MCNC: 0.2 MG/DL (ref 0.1–2)
BUN BLD-MCNC: 5 MG/DL (ref 9–23)
C3 SERPL-MCNC: 72 MG/DL (ref 90–180)
C4 SERPL-MCNC: 17.2 MG/DL (ref 10–40)
CALCIUM BLD-MCNC: 8.8 MG/DL (ref 8.5–10.1)
CHLORIDE SERPL-SCNC: 109 MMOL/L (ref 98–112)
CK SERPL-CCNC: 113 U/L
CO2 SERPL-SCNC: 29 MMOL/L (ref 21–32)
CREAT BLD-MCNC: 0.48 MG/DL
CRP SERPL-MCNC: <0.29 MG/DL (ref ?–0.3)
DEPRECATED HBV CORE AB SER IA-ACNC: 103.1 NG/ML
EGFRCR SERPLBLD CKD-EPI 2021: 112 ML/MIN/1.73M2 (ref 60–?)
EOSINOPHIL # BLD AUTO: 0.03 X10(3) UL (ref 0–0.7)
EOSINOPHIL NFR BLD AUTO: 0.8 %
ERYTHROCYTE [DISTWIDTH] IN BLOOD BY AUTOMATED COUNT: 16.1 %
ERYTHROCYTE [SEDIMENTATION RATE] IN BLOOD: 68 MM/HR
FASTING STATUS PATIENT QL REPORTED: NO
GLOBULIN PLAS-MCNC: 4.4 G/DL (ref 2.8–4.4)
GLUCOSE BLD-MCNC: 99 MG/DL (ref 70–99)
HCT VFR BLD AUTO: 37.2 %
HGB BLD-MCNC: 11.8 G/DL
IMM GRANULOCYTES # BLD AUTO: 0.01 X10(3) UL (ref 0–1)
IMM GRANULOCYTES NFR BLD: 0.3 %
LYMPHOCYTES # BLD AUTO: 0.9 X10(3) UL (ref 1–4)
LYMPHOCYTES NFR BLD AUTO: 25.4 %
MCH RBC QN AUTO: 28.2 PG (ref 26–34)
MCHC RBC AUTO-ENTMCNC: 31.7 G/DL (ref 31–37)
MCV RBC AUTO: 88.8 FL
MONOCYTES # BLD AUTO: 0.26 X10(3) UL (ref 0.1–1)
MONOCYTES NFR BLD AUTO: 7.3 %
NEUTROPHILS # BLD AUTO: 2.33 X10 (3) UL (ref 1.5–7.7)
NEUTROPHILS # BLD AUTO: 2.33 X10(3) UL (ref 1.5–7.7)
NEUTROPHILS NFR BLD AUTO: 65.9 %
OSMOLALITY SERPL CALC.SUM OF ELEC: 285 MOSM/KG (ref 275–295)
PLATELET # BLD AUTO: 249 10(3)UL (ref 150–450)
POTASSIUM SERPL-SCNC: 4 MMOL/L (ref 3.5–5.1)
PROT SERPL-MCNC: 7.8 G/DL (ref 6.4–8.2)
RBC # BLD AUTO: 4.19 X10(6)UL
SODIUM SERPL-SCNC: 139 MMOL/L (ref 136–145)
WBC # BLD AUTO: 3.5 X10(3) UL (ref 4–11)

## 2024-02-03 PROCEDURE — 85025 COMPLETE CBC W/AUTO DIFF WBC: CPT | Performed by: INTERNAL MEDICINE

## 2024-02-03 PROCEDURE — 82550 ASSAY OF CK (CPK): CPT | Performed by: INTERNAL MEDICINE

## 2024-02-03 PROCEDURE — 82728 ASSAY OF FERRITIN: CPT | Performed by: INTERNAL MEDICINE

## 2024-02-03 PROCEDURE — 86160 COMPLEMENT ANTIGEN: CPT | Performed by: INTERNAL MEDICINE

## 2024-02-03 PROCEDURE — 85652 RBC SED RATE AUTOMATED: CPT | Performed by: INTERNAL MEDICINE

## 2024-02-03 PROCEDURE — 80053 COMPREHEN METABOLIC PANEL: CPT | Performed by: INTERNAL MEDICINE

## 2024-02-03 PROCEDURE — 86140 C-REACTIVE PROTEIN: CPT | Performed by: INTERNAL MEDICINE

## 2024-02-06 ENCOUNTER — TELEPHONE (OUTPATIENT)
Dept: RHEUMATOLOGY | Facility: CLINIC | Age: 54
End: 2024-02-06

## 2024-02-06 NOTE — TELEPHONE ENCOUNTER
Medical Certification ([facility]) received at Forms Department on 1/31/2024. Missing EKATERINA/FCR - sent PT a [email or web address] message. Logged for processing.

## 2024-02-07 ENCOUNTER — OFFICE VISIT (OUTPATIENT)
Dept: RHEUMATOLOGY | Facility: CLINIC | Age: 54
End: 2024-02-07
Payer: COMMERCIAL

## 2024-02-07 ENCOUNTER — TELEPHONE (OUTPATIENT)
Dept: RHEUMATOLOGY | Facility: CLINIC | Age: 54
End: 2024-02-07

## 2024-02-07 VITALS
OXYGEN SATURATION: 98 % | SYSTOLIC BLOOD PRESSURE: 102 MMHG | HEIGHT: 60 IN | BODY MASS INDEX: 20.03 KG/M2 | DIASTOLIC BLOOD PRESSURE: 62 MMHG | HEART RATE: 80 BPM | RESPIRATION RATE: 14 BRPM | TEMPERATURE: 98 F | WEIGHT: 102 LBS

## 2024-02-07 DIAGNOSIS — Z79.899 IMMUNODEFICIENCY DUE TO DRUG THERAPY  (HCC): ICD-10-CM

## 2024-02-07 DIAGNOSIS — M32.9 SYSTEMIC LUPUS ERYTHEMATOSUS, UNSPECIFIED SLE TYPE, UNSPECIFIED ORGAN INVOLVEMENT STATUS (HCC): Primary | ICD-10-CM

## 2024-02-07 DIAGNOSIS — D84.821 IMMUNODEFICIENCY DUE TO DRUG THERAPY  (HCC): ICD-10-CM

## 2024-02-07 DIAGNOSIS — Z51.81 THERAPEUTIC DRUG MONITORING: ICD-10-CM

## 2024-02-07 DIAGNOSIS — Z79.52 LONG TERM CURRENT USE OF SYSTEMIC STEROIDS: ICD-10-CM

## 2024-02-07 DIAGNOSIS — K12.1 ORAL ULCER: ICD-10-CM

## 2024-02-07 PROCEDURE — 99215 OFFICE O/P EST HI 40 MIN: CPT | Performed by: INTERNAL MEDICINE

## 2024-02-07 PROCEDURE — 3078F DIAST BP <80 MM HG: CPT | Performed by: INTERNAL MEDICINE

## 2024-02-07 PROCEDURE — 3074F SYST BP LT 130 MM HG: CPT | Performed by: INTERNAL MEDICINE

## 2024-02-07 PROCEDURE — 3008F BODY MASS INDEX DOCD: CPT | Performed by: INTERNAL MEDICINE

## 2024-02-07 NOTE — PATIENT INSTRUCTIONS
Repeat blood work late March/early April    Continue Cellcept 500 mg twice daily. Consider increasing dose to 750 mg twice daily. General recommendation would be to consider decreasing cellcept after 6 months being off prednisone.     Continue prednisone 5 mg daily x 14 more days, THEN 1 tablet (2.5 mg total) daily for 14 days, THEN 1 tablet (2.5 mg total) every other day for 14 days.,     Vaccinations:  -Strep Pneumonia vaccines: Get Prevnar 20 vaccine  -Shingles vaccine: 1 dose, then another dose 3 months later.  -Get TDAP every 10 years  -get hepatitis B vaccine series

## 2024-02-07 NOTE — PROGRESS NOTES
Rheumatology f/u Patient Note  =====================================================================================================  ?  Chief Complaint:   SLE    Chief Complaint   Patient presents with    Follow - Up    SLE     Doing okay. No rash. Has joint today. Shoulder blades and neck. 4/10. Almost every day since flair up. 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 1st episode given steroids and abx, this helped. 2nd episode: given abx only, which didn't help. Had tinnitus as well. Eventually, given prednisone 20 mg daily x 10 days and aleve by ENT. No antecedent supplements or drugs.  Sometimes takes aleve prn for pain/flaring of ears. Saw Dr. Sales (ENT at [place]), diagnosed with RPC, Audio: Bilateral mild high frequency SNHL, normal tympanograms  Also with stiffness in the hands a few months later, also with intermittent rashes in back. Photosensitive fatigue that is new. +Dry eye, feels dry and itching recently; saw ophtho ([facility], Dr. Ye) recently; no issues in eyes.   Feels more mucous in the airway and irritation in the airway recently.   Currently ear is 75% better from peak of inflammation. Has echocardiogram ordered by PCP office. Not completed yet.   2 kids:   +miscarriages: 2x. 10 or 12 weeks.   Denies current alopecia, malar rash, discoid lesions, oral/nasal ulcers, pleuritic chest pain, arthritis, seizures/psychosis, Raynaud's, dry mouth,or blood clots.  ==============================================================================================================  Visit: 01/03/24  Doing much better.  Here with  taper down the prednisone 10 mg  daily 1 week ago.  ?ocular migraines per ophtho. Took APAP which helped. Using artificial tears which helped.  Could not tolerate 500 mg capsule of MMF, now taking 250 mg of MMF.  Now taking 250 mg twice daily  More active now, doing Taichi. Muscles much less achy.  -Oral ulcers improved.  Hand rash is improved  ==============================================================================================================  Today's Visit: 02/07/24    Wants to get off medications.   Feeling very well. Could not tolerate higher doses of cellcept 1000 mg/day  More trapezius pain.    Medications/therapies:  Hydroxychloroquine (plaquenil) 200 mg daily  Cellcept 500 mg twice daily  Prednisone 5 mg daily x 2 weeks so far.     5 point ROS negative except noted above  I had reviewed past medical and family histories together with allergy and medication lists documented.    Medications:  Outpatient Medications Marked as Taking for the 2/7/24 encounter (Office Visit) with Edis Guajardo MD   Medication Sig Dispense Refill    predniSONE 2.5 MG Oral Tab Take 4 tablets (10 mg total) by mouth daily for 7 days, THEN 3 tablets (7.5 mg total) daily for 14 days, THEN 2 tablets (5 mg total) daily for 30 days, THEN 1 tablet (2.5 mg total) daily for 14 days, THEN 1 tablet (2.5 mg total) every other day for 14 days. 151 tablet 0    mycophenolate mofetil (CELLCEPT) 250 MG Oral Cap Take 4 capsules (1,000 mg total) by mouth 2 (two) times daily before meals. 720 capsule 0    hydroxychloroquine 200 MG Oral Tab Take 1 tablet (200 mg total) by mouth daily.       Modified Medications    No medications on file     There are no discontinued medications.    ?  Allergies:  Allergies   Allergen Reactions    Acyclovir HIVES and PAIN    Amoxicillin HIVES    Pantoprazole DIARRHEA and HIVES       Objective    Vitals:    02/07/24 1247   BP: 102/62   BP Location: Left arm   Patient Position: Sitting   Cuff Size: adult   Pulse: 80   Resp: 14   Temp: 97.8 °F  (36.6 °C)   SpO2: 98%   Weight: 102 lb (46.3 kg)   Height: 5' (1.524 m)     GEN: NAD, well-nourished.   HEENT: Head: NCAT. Face: No lesions. Eyes: Conjunctiva clear. Sclera are anicteric. PERRLA. EOMs are full.   -Mild small oral ulcers at the base of the tongue  CV: RRR, no mrg, S1/S2  PULM:  CTAB, easy effort  Extremities: No cyanosis, edema or deformities.   Neurologic: Strength, CN2-12 grossly intact   Psych: normal affect.   Skin: No lesions or rashes.  MSK: 28 joint count performed. No evidence of synovitis in mcp, pip, dip, wrist, elbows, shoulders, hips, knees, ankles, mtp unless otherwise noted. Full ROM of elbows, wrists, knees.      Mild tenderness overlying left MCP 3    Labs:  Lab Results   Component Value Date     02/03/2024    CK 41 12/26/2023     12/11/2023     (H) 12/06/2023     (H) 12/05/2023     (H) 12/01/2023       Lab Results   Component Value Date    AST 28 02/03/2024    AST 25 01/11/2024    AST 39 (H) 12/26/2023     (H) 12/13/2023     (H) 12/11/2023     (H) 12/06/2023    AST 1,057 (H) 12/05/2023     (H) 12/01/2023    AST 33 11/01/2021    AST 20 06/25/2021    ALT 18 02/03/2024    ALT 25 01/11/2024    ALT 47 12/26/2023     (H) 12/13/2023     (H) 12/11/2023     (H) 12/06/2023     (H) 12/05/2023     (H) 12/01/2023    ALT 41 11/01/2021    ALT 23 06/25/2021         Lab Results   Component Value Date    WBC 3.5 (L) 02/03/2024    RBC 4.19 02/03/2024    HGB 11.8 (L) 02/03/2024    HCT 37.2 02/03/2024    .0 02/03/2024    MCV 88.8 02/03/2024    MCH 28.2 02/03/2024    MCHC 31.7 02/03/2024    RDW 16.1 02/03/2024    NEPRELIM 2.33 02/03/2024    NEPERCENT 65.9 02/03/2024    LYPERCENT 25.4 02/03/2024    MOPERCENT 7.3 02/03/2024    EOPERCENT 0.8 02/03/2024    BAPERCENT 0.3 02/03/2024    NE 2.33 02/03/2024    LYMABS 0.90 (L) 02/03/2024    MOABSO 0.26 02/03/2024    EOABSO 0.03 02/03/2024    BAABSO 0.01 02/03/2024      Lab Results   Component Value Date    GLU 99 02/03/2024    BUN 5 (L) 02/03/2024    BUNCREA 15.2 06/25/2021    CREATSERUM 0.48 (L) 02/03/2024    ANIONGAP 1 02/03/2024    GFRNAA 103 11/01/2021    GFRAA 118 11/01/2021    CA 8.8 02/03/2024    OSMOCALC 285 02/03/2024    ALKPHO 64 02/03/2024    AST 28 02/03/2024    ALT 18 02/03/2024    BILT 0.2 02/03/2024    TP 7.8 02/03/2024    ALB 3.4 02/03/2024    GLOBULIN 4.4 02/03/2024     02/03/2024    K 4.0 02/03/2024     02/03/2024    CO2 29.0 02/03/2024     Lab Results   Component Value Date    MESFIN 103.1 02/03/2024    MESFIN 368.0 (H) 01/11/2024    MESFIN 1,404.9 (H) 12/11/2023    MESFIN 2,574.4 (H) 12/05/2023    MESFIN 3,385.8 (H) 12/01/2023 11/2023  Creatinine 0.50, rest of BMP WNL  WBC 1.5, hemoglobin 11.0  U/a WNL    10/2022  UPCR less than 4/14.02  WBC 2.16, rest of CBC WNL  dsDNA greater than 300, SSA 7.1, chromatin 8.0  CK WNL  PEDRITO 1: 1280 homogenous  C3 59, C4 15    Lab Results   Component Value Date    ANAS Positive (A) 06/25/2021     Lab Results   Component Value Date     (H) 06/25/2021     (H) 06/25/2021     Lab Results   Component Value Date    DSDNA 394 (H) 06/25/2021    SMUD <100 06/25/2021    RNP <100 06/25/2021     Lab Results   Component Value Date    SCL70 <100 06/25/2021     Lab Results   Component Value Date    C3 72.0 (L) 02/03/2024    C4 17.2 02/03/2024     Lab Results   Component Value Date    DRVVT Negative 11/01/2021    [id number] 1.2 11/01/2021    [id number] <2.9 11/01/2021     Lab Results   Component Value Date    CARDIOLIPIGG 4.2 11/01/2021    CARDIOLIPIGM 4.1 11/01/2021 11/2021  C3 71.4, C4 19  CRP less than 0.29  Sed rate 39   lupus anticoagulant, anticardiolipin IgG/IgM, beta-2 glycoprotein IgG/IgM negative  RF negative  MPO 45    2021 labs  PEDRITO positive    SSB: 112  dsDNA 394  Histone 272  WBC 2.8, hemoglobin 12.2, platelets 200  Vitamin D 34.2    Radiology:    12/2023 TTE normal    10/2023 DEXA  LUMBAR SPINE  ANALYSIS RESULTS:      Bone mineral density (BMD) (g/cm2):  41.71    Lumbar T-Score:  0.860      % young normals:  82      % age matched controls:  93      Change from prior spine examination:  n/a               TOTAL HIP ANALYSIS RESULTS:        Bone mineral density (BMD) (g/cm2):  0.599      Total Hip T-Score:  -1.2      % young normals:  82      % age matched controls:  113      Change from prior hip examination:  n/a               FEMORAL NECK ANALYSIS RESULTS:        Bone mineral density (BMD) (g/cm2):  0.736      Femoral neck T-Score:  -1.0      % young normals:  87      % age matched controls:  101      Change from prior hip examination:  n/a            ADDITIONAL FINDINGS:  No significant additional findings.  Ten year fracture risk for major osteoporotic fracture is 2.8% and for hip fracture 0.1%.     4/2021 MRI TMJ  IMPRESSION:   1.  Grossly normal left TMJ.   2.  Mild to moderate right TMJ chronic degenerative changes, with diminutive/chronically   degenerated/frayed disc that exhibits anterior displacement with probable PARTIAL reduction.   Associated stretched and/or partially torn retrodiscal soft tissues.   Please see above for details and additional information.    4/2021 MRI IAC  IMPRESSION:   Essentially normal contrast-enhanced MRI of the IACs.   Radiology review:      =====================================================================================================  Assessment and Plan    Assessment:  1. Systemic lupus erythematosus, unspecified SLE type, unspecified organ involvement status (HCC)    2. Therapeutic drug monitoring    3. Long term current use of systemic steroids    4. 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 Oral ulcer        #SLE: diagnosed 2021.   -Clinical manifestations: Chondritis, arthralgia/synovitis, dry eye, photosensitive fatigue, rash, oral ulcers, miscarriage x 2 (>10 weeks).   -Serologies/labs: Positive PEDRITO, + SSA, + SSB, + dsDNA, + histone,  leukopenia/lymphopenia, low C3, +MPO (likely from SLE)  -Recent lupus flare in Nov 2023 with the following:  -Worsening oral ulcers, low-grade temperatures, fatigue,   -Pancytopenia, particularly neutropenia and thrombocytopenia noted.  Likely due to SLE  -Mixed hepatocellular/cholestatic injury with significant elevation in AST  -Elevated CK and myopathy, myalgia/myopathy pointing to myositis.   -Overall has appeared to have achieved a low disease activity/remission with combination low-dose CellCept/hydroxychloroquine along with prednisone taper.  Patient preferences and to a degree medication intolerance as stymied optimization of CellCept dosing.    The following in italics were not discussed today:  #Recurrent chondritis. she has had 2 episodes (in October 2020 and in February 2021) that is mostly resolved with corticosteroid therapy. Has seen Dr. Sales at [facility]. Some response to NSAIDs.  Audiogram with mild high-frequency SNHL. MRI IAC wnl. Previously seen by ophthalmology ([facility], Dr. Ye); no evidence of scleritis/keratitis/uveitis.  -HRCT in 11/2021 without tracheitis or ILD  # Positive CMV IgM: Negative CMV PCR.  Thought to be a false positive per ID.  Now stopped valganciclovir.    Plan:  -Again had a long discussion today regarding the pathogenesis and natural course/prognosis of SLE.  Patient is quite adamant about coming off of the medications.  Wants to get off the CellCept now.  She has not achieved prednisone free remission yet.  Need to taper off prednisone first.  I generally would recommend 2 years of CellCept therapy after achieving prednisone free remission.  But we will work closely with the patient's beliefs and preferences.  I again reiterated to her that she had a moderate/severe SLE flare with moderate neutropenia and very high AST/ALT indicating lupoid hepatitis, high CK indicating lupus myositis.      Repeat SLE blood work late March/early April    Continue Cellcept 500 mg  twice daily.  Would recommend increasing dose to 750 mg twice daily if possible. General recommendation would be to consider decreasing cellcept after 6 months being off prednisone.     Continue prednisone 5 mg daily x 14 more days, THEN 1 tablet (2.5 mg total) daily for 14 days, THEN 1 tablet (2.5 mg total) every other day for 14 days.,    Continue hydroxychloroquine 200 mg daily    Vaccinations:  -Strep Pneumonia vaccines: Get Prevnar 20 vaccine  -Shingles vaccine: 1 dose, then another dose 3 months later.  -Get TDAP every 10 years  -get hepatitis B vaccine series      Rtc 8 weeks      A total of 44 minutes were spent face-to-face with the patient during this encounter and over half of that time was spent on counseling and coordination of care.      Diagnoses and all orders for this visit:    Systemic lupus erythematosus, unspecified SLE type, unspecified organ involvement status (HCC)  -     Comp Metabolic Panel (14); Future  -     Complement C3, Serum; Future  -     Complement C4, Serum; Future  -     CBC With Differential With Platelet; Future  -     Sed Rate, Westergren (Automated); Future  -     C-Reactive Protein; Future  -     Urinalysis with Culture Reflex; Future  -     Protein,Total,Urine, Random; Future  -     Creatinine, Urine, Random; Future    Therapeutic drug monitoring  -     Comp Metabolic Panel (14); Future  -     Complement C3, Serum; Future  -     Complement C4, Serum; Future  -     CBC With Differential With Platelet; Future  -     Sed Rate, Westergren (Automated); Future  -     C-Reactive Protein; Future  -     Urinalysis with Culture Reflex; Future  -     Protein,Total,Urine, Random; Future  -     Creatinine, Urine, Random; Future    Long term current use of systemic steroids    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      The above plan of care, diagnosis, orders, and follow-up were discussed with the patient. Questions  related to this recommended plan of care were answered.    Thank you for referring this delightful patient to me. Please feel free to contact me with any questions.     This report was performed utilizing speech recognition software technology. Despite proofreading, speech recognition errors could escape detection. If a word or phrase is confusing or out of context, please do not hesitate to call for   clarification.       Kind regards      Edis Guajardo MD  [facility]

## 2024-02-19 NOTE — TELEPHONE ENCOUNTER
Type of Leave: Work from home  Reason for Leave: Lupus  Start date of leave: 2/5/24-8/5/24  How much time needed?: 6 months  Forms Due Date:asap  Was Fee and Turnaround info Given?: Yes

## 2024-02-19 NOTE — TELEPHONE ENCOUNTER
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 Please be aware there is a Rheumatologist Dr Edis Guajardo . Thanks.

## 2024-02-19 NOTE — TELEPHONE ENCOUNTER
Dr. Guajardo,    Pt is requesting work accommodations to work from home due to lupus. Pt is requesting this accommodation starting 2/5/24-8/5/24. Do you support?    Thank you,  La THOMAS

## 2024-03-05 ENCOUNTER — TELEPHONE (OUTPATIENT)
Dept: FAMILY MEDICINE CLINIC | Facility: CLINIC | Age: 54
End: 2024-03-05

## 2024-03-05 DIAGNOSIS — Z23 NEED FOR VACCINATION: Primary | ICD-10-CM

## 2024-03-05 NOTE — TELEPHONE ENCOUNTER
Orders for Pneumonia vaccine    Per patient these were recommend to get:    Vaccinations:  -Strep Pneumonia vaccines: Get Prevnar 20 vaccine  -Shingles vaccine: 1 dose, then another dose 3 months later.  -Get TDAP every 10 years  -get hepatitis B vaccine series    Patient will only be doing one at a time.      This week Pneumonia

## 2024-03-08 ENCOUNTER — NURSE ONLY (OUTPATIENT)
Dept: FAMILY MEDICINE CLINIC | Facility: CLINIC | Age: 54
End: 2024-03-08
Payer: COMMERCIAL

## 2024-03-08 PROCEDURE — 90471 IMMUNIZATION ADMIN: CPT | Performed by: PHYSICIAN ASSISTANT

## 2024-03-08 PROCEDURE — 90677 PCV20 VACCINE IM: CPT | Performed by: PHYSICIAN ASSISTANT

## 2024-03-13 ENCOUNTER — TELEPHONE (OUTPATIENT)
Dept: FAMILY MEDICINE CLINIC | Facility: CLINIC | Age: 54
End: 2024-03-13

## 2024-03-15 ENCOUNTER — NURSE ONLY (OUTPATIENT)
Dept: FAMILY MEDICINE CLINIC | Facility: CLINIC | Age: 54
End: 2024-03-15
Payer: COMMERCIAL

## 2024-03-25 ENCOUNTER — LAB ENCOUNTER (OUTPATIENT)
Dept: LAB | Age: 54
End: 2024-03-25
Attending: INTERNAL MEDICINE
Payer: COMMERCIAL

## 2024-03-25 DIAGNOSIS — R79.89 ABNORMAL LFTS: ICD-10-CM

## 2024-03-25 LAB
ALBUMIN SERPL-MCNC: 3.6 G/DL (ref 3.4–5)
ALP LIVER SERPL-CCNC: 91 U/L
ALT SERPL-CCNC: 23 U/L
AST SERPL-CCNC: 23 U/L (ref 15–37)
BILIRUB DIRECT SERPL-MCNC: <0.1 MG/DL (ref 0–0.2)
BILIRUB SERPL-MCNC: <0.1 MG/DL (ref 0.1–2)
PROT SERPL-MCNC: 8.1 G/DL (ref 6.4–8.2)

## 2024-03-25 PROCEDURE — 80076 HEPATIC FUNCTION PANEL: CPT

## 2024-03-25 PROCEDURE — 36415 COLL VENOUS BLD VENIPUNCTURE: CPT

## 2024-03-26 ENCOUNTER — TELEPHONE (OUTPATIENT)
Dept: FAMILY MEDICINE CLINIC | Facility: CLINIC | Age: 54
End: 2024-03-26

## 2024-03-28 ENCOUNTER — NURSE ONLY (OUTPATIENT)
Dept: FAMILY MEDICINE CLINIC | Facility: CLINIC | Age: 54
End: 2024-03-28
Payer: COMMERCIAL

## 2024-03-28 PROCEDURE — 90715 TDAP VACCINE 7 YRS/> IM: CPT | Performed by: PHYSICIAN ASSISTANT

## 2024-03-28 PROCEDURE — 90471 IMMUNIZATION ADMIN: CPT | Performed by: PHYSICIAN ASSISTANT

## 2024-04-02 ENCOUNTER — TELEPHONE (OUTPATIENT)
Dept: FAMILY MEDICINE CLINIC | Facility: CLINIC | Age: 54
End: 2024-04-02

## 2024-04-04 ENCOUNTER — NURSE ONLY (OUTPATIENT)
Dept: FAMILY MEDICINE CLINIC | Facility: CLINIC | Age: 54
End: 2024-04-04
Payer: COMMERCIAL

## 2024-04-04 PROCEDURE — 90746 HEPB VACCINE 3 DOSE ADULT IM: CPT | Performed by: PHYSICIAN ASSISTANT

## 2024-04-04 PROCEDURE — 90471 IMMUNIZATION ADMIN: CPT | Performed by: PHYSICIAN ASSISTANT

## 2024-05-01 ENCOUNTER — LAB ENCOUNTER (OUTPATIENT)
Dept: LAB | Age: 54
End: 2024-05-01
Attending: INTERNAL MEDICINE
Payer: COMMERCIAL

## 2024-05-01 DIAGNOSIS — Z51.81 THERAPEUTIC DRUG MONITORING: ICD-10-CM

## 2024-05-01 DIAGNOSIS — M32.9 SYSTEMIC LUPUS ERYTHEMATOSUS, UNSPECIFIED SLE TYPE, UNSPECIFIED ORGAN INVOLVEMENT STATUS (HCC): ICD-10-CM

## 2024-05-01 LAB
ALBUMIN SERPL-MCNC: 3.5 G/DL (ref 3.4–5)
ALBUMIN/GLOB SERPL: 0.8 {RATIO} (ref 1–2)
ALP LIVER SERPL-CCNC: 105 U/L
ALT SERPL-CCNC: 19 U/L
ANION GAP SERPL CALC-SCNC: 5 MMOL/L (ref 0–18)
AST SERPL-CCNC: 22 U/L (ref 15–37)
BASOPHILS # BLD AUTO: 0.01 X10(3) UL (ref 0–0.2)
BASOPHILS NFR BLD AUTO: 0.2 %
BILIRUB SERPL-MCNC: 0.5 MG/DL (ref 0.1–2)
BILIRUB UR QL STRIP.AUTO: NEGATIVE
BUN BLD-MCNC: 10 MG/DL (ref 9–23)
C3 SERPL-MCNC: 97.6 MG/DL (ref 90–180)
C4 SERPL-MCNC: 26.3 MG/DL (ref 10–40)
CALCIUM BLD-MCNC: 9 MG/DL (ref 8.5–10.1)
CHLORIDE SERPL-SCNC: 106 MMOL/L (ref 98–112)
CLARITY UR REFRACT.AUTO: CLEAR
CO2 SERPL-SCNC: 28 MMOL/L (ref 21–32)
COLOR UR AUTO: COLORLESS
CREAT BLD-MCNC: 0.68 MG/DL
CREAT UR-SCNC: 20.5 MG/DL
CRP SERPL-MCNC: 0.92 MG/DL (ref ?–0.3)
EGFRCR SERPLBLD CKD-EPI 2021: 102 ML/MIN/1.73M2 (ref 60–?)
EOSINOPHIL # BLD AUTO: 0.04 X10(3) UL (ref 0–0.7)
EOSINOPHIL NFR BLD AUTO: 1 %
ERYTHROCYTE [DISTWIDTH] IN BLOOD BY AUTOMATED COUNT: 13.2 %
ERYTHROCYTE [SEDIMENTATION RATE] IN BLOOD: 83 MM/HR
FASTING STATUS PATIENT QL REPORTED: NO
GLOBULIN PLAS-MCNC: 4.2 G/DL (ref 2.8–4.4)
GLUCOSE BLD-MCNC: 88 MG/DL (ref 70–99)
GLUCOSE UR STRIP.AUTO-MCNC: NORMAL MG/DL
HCT VFR BLD AUTO: 38.6 %
HGB BLD-MCNC: 11.9 G/DL
IMM GRANULOCYTES # BLD AUTO: 0 X10(3) UL (ref 0–1)
IMM GRANULOCYTES NFR BLD: 0 %
KETONES UR STRIP.AUTO-MCNC: NEGATIVE MG/DL
LEUKOCYTE ESTERASE UR QL STRIP.AUTO: NEGATIVE
LYMPHOCYTES # BLD AUTO: 1.3 X10(3) UL (ref 1–4)
LYMPHOCYTES NFR BLD AUTO: 32.1 %
MCH RBC QN AUTO: 27.1 PG (ref 26–34)
MCHC RBC AUTO-ENTMCNC: 30.8 G/DL (ref 31–37)
MCV RBC AUTO: 87.9 FL
MONOCYTES # BLD AUTO: 0.29 X10(3) UL (ref 0.1–1)
MONOCYTES NFR BLD AUTO: 7.2 %
NEUTROPHILS # BLD AUTO: 2.41 X10 (3) UL (ref 1.5–7.7)
NEUTROPHILS # BLD AUTO: 2.41 X10(3) UL (ref 1.5–7.7)
NEUTROPHILS NFR BLD AUTO: 59.5 %
NITRITE UR QL STRIP.AUTO: NEGATIVE
OSMOLALITY SERPL CALC.SUM OF ELEC: 286 MOSM/KG (ref 275–295)
PH UR STRIP.AUTO: 6.5 [PH] (ref 5–8)
PLATELET # BLD AUTO: 259 10(3)UL (ref 150–450)
POTASSIUM SERPL-SCNC: 3.9 MMOL/L (ref 3.5–5.1)
PROT SERPL-MCNC: 7.7 G/DL (ref 6.4–8.2)
PROT UR STRIP.AUTO-MCNC: NEGATIVE MG/DL
PROT UR-MCNC: <5 MG/DL
RBC # BLD AUTO: 4.39 X10(6)UL
RBC UR QL AUTO: NEGATIVE
SODIUM SERPL-SCNC: 139 MMOL/L (ref 136–145)
SP GR UR STRIP.AUTO: 1.01 (ref 1–1.03)
UROBILINOGEN UR STRIP.AUTO-MCNC: NORMAL MG/DL
WBC # BLD AUTO: 4.1 X10(3) UL (ref 4–11)

## 2024-05-01 PROCEDURE — 85652 RBC SED RATE AUTOMATED: CPT | Performed by: INTERNAL MEDICINE

## 2024-05-01 PROCEDURE — 85025 COMPLETE CBC W/AUTO DIFF WBC: CPT | Performed by: INTERNAL MEDICINE

## 2024-05-01 PROCEDURE — 86140 C-REACTIVE PROTEIN: CPT | Performed by: INTERNAL MEDICINE

## 2024-05-01 PROCEDURE — 84156 ASSAY OF PROTEIN URINE: CPT | Performed by: INTERNAL MEDICINE

## 2024-05-01 PROCEDURE — 81003 URINALYSIS AUTO W/O SCOPE: CPT | Performed by: INTERNAL MEDICINE

## 2024-05-01 PROCEDURE — 82570 ASSAY OF URINE CREATININE: CPT | Performed by: INTERNAL MEDICINE

## 2024-05-01 PROCEDURE — 86160 COMPLEMENT ANTIGEN: CPT | Performed by: INTERNAL MEDICINE

## 2024-05-01 PROCEDURE — 80053 COMPREHEN METABOLIC PANEL: CPT | Performed by: INTERNAL MEDICINE

## 2024-05-07 ENCOUNTER — OFFICE VISIT (OUTPATIENT)
Dept: RHEUMATOLOGY | Facility: CLINIC | Age: 54
End: 2024-05-07
Payer: COMMERCIAL

## 2024-05-07 VITALS
DIASTOLIC BLOOD PRESSURE: 62 MMHG | WEIGHT: 103 LBS | BODY MASS INDEX: 20.22 KG/M2 | HEART RATE: 78 BPM | TEMPERATURE: 98 F | HEIGHT: 60 IN | OXYGEN SATURATION: 100 % | RESPIRATION RATE: 14 BRPM | SYSTOLIC BLOOD PRESSURE: 94 MMHG

## 2024-05-07 DIAGNOSIS — D84.821 IMMUNODEFICIENCY DUE TO DRUG THERAPY (HCC): ICD-10-CM

## 2024-05-07 DIAGNOSIS — Z79.899 IMMUNODEFICIENCY DUE TO DRUG THERAPY (HCC): ICD-10-CM

## 2024-05-07 DIAGNOSIS — M32.9 SYSTEMIC LUPUS ERYTHEMATOSUS, UNSPECIFIED SLE TYPE, UNSPECIFIED ORGAN INVOLVEMENT STATUS (HCC): Primary | ICD-10-CM

## 2024-05-07 DIAGNOSIS — Z51.81 THERAPEUTIC DRUG MONITORING: ICD-10-CM

## 2024-05-07 DIAGNOSIS — M60.9: ICD-10-CM

## 2024-05-07 RX ORDER — HYDROXYCHLOROQUINE SULFATE 200 MG/1
200 TABLET, FILM COATED ORAL DAILY
Qty: 90 TABLET | Refills: 1 | Status: SHIPPED | OUTPATIENT
Start: 2024-05-07

## 2024-05-07 RX ORDER — MYCOPHENOLATE MOFETIL 500 MG/1
500 TABLET ORAL 2 TIMES DAILY
COMMUNITY
Start: 2024-05-07

## 2024-05-07 NOTE — PATIENT INSTRUCTIONS
[facility]:   -Dr. Barry or Dr. Wilson    Continue cellcept and hydroxychloroquine (plaquenil) for now

## 2024-05-07 NOTE — PROGRESS NOTES
Rheumatology f/u Patient Note  =====================================================================================================  ?  Chief Complaint:   SLE    Chief Complaint   Patient presents with    Follow - Up     LOV 2/7/24 lupus - Pt is doing better overall. Pt is having swollen joints in different places.  Pt is taking CELLCEPT and hydroxychloroquine 200 MG rapid 3 score is a 2.3.     PCP  Tye Xie MD  Fax: [phone]  Phone: [phone]  =====================================================================================================  HPI 9/2021 HPI  Arturo Ulloa is a 54 year old female   Here for evaluation of relapsing polychondritis and SLE  [facility] until:  October 2020 and Feb 2021: developed chondritis in left ear. 1st episode given steroids and abx, this helped. 2nd episode: given abx only, which didn't help. Had tinnitus as well. Eventually, given prednisone 20 mg daily x 10 days and aleve by ENT. No antecedent supplements or drugs.  Sometimes takes aleve prn for pain/flaring of ears. Saw Dr. Sales (ENT at [place]), diagnosed with RPC, Audio: Bilateral mild high frequency SNHL, normal tympanograms  Also with stiffness in the hands a few months later, also with intermittent rashes in back. Photosensitive fatigue that is new. +Dry eye, feels dry and itching recently; saw ophtho ([facility], Dr. Ye) recently; no issues in eyes.   Feels more mucous in the airway and irritation in the airway recently.   Currently ear is 75% better from peak of inflammation. Has echocardiogram ordered by PCP office. Not completed yet.   2 kids:   +miscarriages: 2x. 10 or 12 weeks.   Denies current alopecia, malar rash, discoid lesions, oral/nasal ulcers, pleuritic chest pain, arthritis, seizures/psychosis, Raynaud's, dry mouth,or blood clots.  ==============================================================================================================  Visit: 01/03/24  Doing much better.  Here  with  taper down the prednisone 10 mg daily 1 week ago.  ?ocular migraines per ophtho. Took APAP which helped. Using artificial tears which helped.  Could not tolerate 500 mg capsule of MMF, now taking 250 mg of MMF.  Now taking 250 mg twice daily  More active now, doing Taichi. Muscles much less achy.  -Oral ulcers improved.  Hand rash is improved  ==============================================================================================================  Visit: 02/07/24  Wants to get off medications.   Feeling very well. Could not tolerate higher doses of cellcept 1000 mg/day  More trapezius pain.  Medications/therapies:  Hydroxychloroquine (plaquenil) 200 mg daily  Cellcept 500 mg twice daily  Prednisone 5 mg daily x 2 weeks so far.   ==============================================================================================================  Today's Visit: 05/07/24    Diffuse hair thinning. Doing well overall. Intermittent arthralgia and swelling of the finger(s) starting few weeks ago.  Tapered off prednisone completely in March 2024.  No mouth sores.  Feeling well overall.  Very motivated to get off all medications    Medications/therapies:  Hydroxychloroquine (plaquenil) 200 mg daily  Off prednisone since March   Cellcept 500 mg twice daily    5 point ROS negative except noted above  I had reviewed past medical and family histories together with allergy and medication lists documented.    Wt Readings from Last 6 Encounters:   05/07/24 103 lb (46.7 kg)   02/07/24 102 lb (46.3 kg)   01/31/24 100 lb (45.4 kg)   01/08/24 102 lb (46.3 kg)   01/03/24 104 lb 6.4 oz (47.4 kg)   12/18/23 102 lb 1.9 oz (46.3 kg)           5 point ROS negative except noted above  I had reviewed past medical and family histories together with allergy and medication lists documented.    Medications:  Outpatient Medications Marked as Taking for the 5/7/24 encounter (Office Visit) with Edis Guajardo MD   Medication Sig Dispense  Refill    hydroxychloroquine 200 MG Oral Tab Take 1 tablet (200 mg total) by mouth daily. 90 tablet 1    Mycophenolate Mofetil 500 MG Oral Tab Take 1 tablet (500 mg total) by mouth 2 (two) times daily.       Modified Medications    Modified Medication Previous Medication    HYDROXYCHLOROQUINE 200 MG ORAL TAB hydroxychloroquine 200 MG Oral Tab       Take 1 tablet (200 mg total) by mouth daily.    Take 1 tablet (200 mg total) by mouth daily.     Medications Discontinued During This Encounter   Medication Reason    hydroxychloroquine 200 MG Oral Tab        ?  Allergies:  Allergies   Allergen Reactions    Acyclovir HIVES and PAIN    Amoxicillin HIVES    Pantoprazole DIARRHEA and HIVES       Objective    Vitals:    05/07/24 1259   BP: 94/62   Pulse: 78   Resp: 14   Temp: 98 °F (36.7 °C)   TempSrc: Temporal   SpO2: 100%   Weight: 103 lb (46.7 kg)   Height: 5' (1.524 m)     GEN: NAD, well-nourished.   HEENT: Head: NCAT. Face: No lesions. Eyes: Conjunctiva clear. Sclera are anicteric. PERRLA. EOMs are full.   CV: RRR, no mrg, S1/S2  PULM:  CTAB, easy effort  Extremities: No cyanosis, edema or deformities.   Neurologic: Strength, CN2-12 grossly intact   Psych: normal affect.   Skin: No lesions or rashes.  MSK: 28 joint count performed. No evidence of synovitis in mcp, pip, dip, wrist, elbows, shoulders, hips, knees, ankles, mtp unless otherwise noted. Full ROM of elbows, wrists, knees.       SJ: 1 (LPIP2)  TJ: 1 (LPIP2)    Labs:    Lab Results   Component Value Date    ESRML 83 (H) 05/01/2024    ESRML 68 (H) 02/03/2024    ESRML 106 (H) 12/26/2023    ESRML 63 (H) 12/01/2023    ESRML 39 (H) 11/01/2021    ESRML 31 (H) 06/25/2021    CRP 0.92 (H) 05/01/2024    CRP <0.29 02/03/2024    CRP 8.60 (H) 12/26/2023    CRP 1.16 (H) 12/01/2023    CRP <0.29 11/01/2021    CRP <0.29 06/25/2021        Lab Results   Component Value Date     02/03/2024    CK 41 12/26/2023     12/11/2023     (H) 12/06/2023     (H)  12/05/2023     (H) 12/01/2023       Lab Results   Component Value Date    AST 22 05/01/2024    AST 23 03/25/2024    AST 28 02/03/2024    AST 25 01/11/2024    AST 39 (H) 12/26/2023     (H) 12/13/2023     (H) 12/11/2023     (H) 12/06/2023    AST 1,057 (H) 12/05/2023     (H) 12/01/2023    ALT 19 05/01/2024    ALT 23 03/25/2024    ALT 18 02/03/2024    ALT 25 01/11/2024    ALT 47 12/26/2023     (H) 12/13/2023     (H) 12/11/2023     (H) 12/06/2023     (H) 12/05/2023     (H) 12/01/2023     Lab Results   Component Value Date    C3 97.6 05/01/2024    C3 72.0 (L) 02/03/2024    C3 80.3 (L) 12/26/2023    C3 40.4 (L) 12/06/2023    C3 27.9 (L) 12/01/2023    C3 71.4 (L) 11/01/2021    C4 26.3 05/01/2024    C4 17.2 02/03/2024    C4 19.1 12/26/2023    C4 7.5 (L) 12/06/2023    C4 6.2 (L) 12/01/2023    C4 19.0 11/01/2021         Lab Results   Component Value Date    WBC 4.1 05/01/2024    RBC 4.39 05/01/2024    HGB 11.9 (L) 05/01/2024    HCT 38.6 05/01/2024    .0 05/01/2024    MCV 87.9 05/01/2024    MCH 27.1 05/01/2024    MCHC 30.8 (L) 05/01/2024    RDW 13.2 05/01/2024    NEPRELIM 2.41 05/01/2024    NEPERCENT 59.5 05/01/2024    LYPERCENT 32.1 05/01/2024    MOPERCENT 7.2 05/01/2024    EOPERCENT 1.0 05/01/2024    BAPERCENT 0.2 05/01/2024    NE 2.41 05/01/2024    LYMABS 1.30 05/01/2024    MOABSO 0.29 05/01/2024    EOABSO 0.04 05/01/2024    BAABSO 0.01 05/01/2024     Lab Results   Component Value Date    GLU 88 05/01/2024    BUN 10 05/01/2024    BUNCREA 15.2 06/25/2021    CREATSERUM 0.68 05/01/2024    ANIONGAP 5 05/01/2024    GFRNAA 103 11/01/2021    GFRAA 118 11/01/2021    CA 9.0 05/01/2024    OSMOCALC 286 05/01/2024    ALKPHO 105 05/01/2024    AST 22 05/01/2024    ALT 19 05/01/2024    BILT 0.5 05/01/2024    TP 7.7 05/01/2024    ALB 3.5 05/01/2024    GLOBULIN 4.2 05/01/2024     05/01/2024    K 3.9 05/01/2024     05/01/2024    CO2 28.0 05/01/2024     Lab  Results   Component Value Date    MESFIN 103.1 02/03/2024    MESFIN 368.0 (H) 01/11/2024    MESFIN 1,404.9 (H) 12/11/2023    MESFIN 2,574.4 (H) 12/05/2023    MESFIN 3,385.8 (H) 12/01/2023 11/2023  Creatinine 0.50, rest of BMP WNL  WBC 1.5, hemoglobin 11.0  U/a WNL    10/2022  UPCR less than 4/14.02  WBC 2.16, rest of CBC WNL  dsDNA greater than 300, SSA 7.1, chromatin 8.0  CK WNL  PEDRITO 1: 1280 homogenous  C3 59, C4 15    Lab Results   Component Value Date    ANAS Positive (A) 06/25/2021     Lab Results   Component Value Date     (H) 06/25/2021     (H) 06/25/2021     Lab Results   Component Value Date    DSDNA 394 (H) 06/25/2021    SMUD <100 06/25/2021    RNP <100 06/25/2021     Lab Results   Component Value Date    SCL70 <100 06/25/2021     Lab Results   Component Value Date    C3 97.6 05/01/2024    C4 26.3 05/01/2024     Lab Results   Component Value Date    DRVVT Negative 11/01/2021    [id number] 1.2 11/01/2021    [id number] <2.9 11/01/2021     Lab Results   Component Value Date    CARDIOLIPIGG 4.2 11/01/2021    CARDIOLIPIGM 4.1 11/01/2021 11/2021  C3 71.4, C4 19  CRP less than 0.29  Sed rate 39   lupus anticoagulant, anticardiolipin IgG/IgM, beta-2 glycoprotein IgG/IgM negative  RF negative  MPO 45    2021 labs  PEDRITO positive    SSB: 112  dsDNA 394  Histone 272  WBC 2.8, hemoglobin 12.2, platelets 200  Vitamin D 34.2    Radiology:    12/2023 TTE normal    10/2023 DEXA  LUMBAR SPINE ANALYSIS RESULTS:      Bone mineral density (BMD) (g/cm2):  41.71    Lumbar T-Score:  0.860      % young normals:  82      % age matched controls:  93      Change from prior spine examination:  n/a               TOTAL HIP ANALYSIS RESULTS:        Bone mineral density (BMD) (g/cm2):  0.599      Total Hip T-Score:  -1.2      % young normals:  82      % age matched controls:  113      Change from prior hip examination:  n/a               FEMORAL NECK ANALYSIS RESULTS:        Bone mineral density (BMD) (g/cm2):  0.736       Femoral neck T-Score:  -1.0      % young normals:  87      % age matched controls:  101      Change from prior hip examination:  n/a            ADDITIONAL FINDINGS:  No significant additional findings.  Ten year fracture risk for major osteoporotic fracture is 2.8% and for hip fracture 0.1%.     4/2021 MRI TMJ  IMPRESSION:   1.  Grossly normal left TMJ.   2.  Mild to moderate right TMJ chronic degenerative changes, with diminutive/chronically   degenerated/frayed disc that exhibits anterior displacement with probable PARTIAL reduction.   Associated stretched and/or partially torn retrodiscal soft tissues.   Please see above for details and additional information.    4/2021 MRI IAC  IMPRESSION:   Essentially normal contrast-enhanced MRI of the IACs.   Radiology review:      =====================================================================================================  Assessment and Plan    Assessment:  1. Systemic lupus erythematosus, unspecified SLE type, unspecified organ involvement status (HCC)    2. 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 Therapeutic drug monitoring    4. Myositis of both upper arms        #SLE: diagnosed 2021.   -Clinical manifestations: Ear chondritis, arthralgia/synovitis, dry eye, photosensitive fatigue, rash, oral ulcers, miscarriage x 2 (>10 weeks).   -Serologies/labs: Positive PEDRITO, + SSA, + SSB, + dsDNA, + histone, leukopenia/lymphopenia, low C3, +MPO (likely from SLE)  -Recent lupus flare in Nov 2023 with the following:  -Worsening oral ulcers, low-grade temperatures, fatigue,   -Pancytopenia, particularly neutropenia and thrombocytopenia noted.  Likely due to SLE  -Mixed hepatocellular/cholestatic injury with significant elevation in AST  -Elevated CK and myopathy, myalgia/myopathy pointing to myositis.     -Overall has appeared to have achieved a low disease activity/remission with combination low-dose CellCept/hydroxychloroquine along with prednisone taper.  Patient  preferences and to a degree medication intolerance has stymied optimization of CellCept dosing.    The following in italics were not discussed today:  #Recurrent chondritis. she has had 2 episodes (in October 2020 and in February 2021) that is mostly resolved with corticosteroid therapy. Has seen Dr. Sales at [place]. Some response to NSAIDs.  Audiogram with mild high-frequency SNHL. MRI IAC wnl. Previously seen by ophthalmology ([facility], Dr. Ye); no evidence of scleritis/keratitis/uveitis.  -HRCT in 11/2021 without tracheitis or ILD  # Positive CMV IgM: Negative CMV PCR.  Thought to be a false positive per ID.  Now stopped valganciclovir.    Plan:  -Again had a long discussion today regarding the pathogenesis and natural course/prognosis of SLE.  Patient is quite adamant about tapering meds.  Wants to get off the CellCept now.  She has tapered off prednisone in March 2024.  However she has had suffered a bit of a minor flare with active inflammatory arthritis.  I generally would recommend 1 to 3 years of CellCept therapy after achieving prednisone free remission in a relatively severe lupus flare as she has suffered..  But we will work closely with the patient's beliefs and preferences.  I again reiterated to her that she had a moderate/severe SLE flare with moderate neutropenia and very high AST/ALT indicating lupoid hepatitis, high CK indicating lupus myositis.      As with prior visits, patient reiterates strong belief to come off all medications now.  After extensive discussion, I do not believe I am articulating effectively the actual risks of stopping immunomodulatory therapy in the context of a recent severe lupus flare.  -Recommend obtaining another opinion with a lupus expert.  Dr. Barry or Dr. Wilson in [facility].    Repeat SLE blood work in Aug 2024.     Continue Cellcept 500 mg twice daily.  Had recommended higher dosing of CellCept but the patient declined.    Recommend increasing  hydroxychloroquine briefly to 400 mg daily given active inflammatory arthritis being off prednisone.  Patient declines.  She will continue hydroxychloroquine 200 mg daily for now.    Vaccinations:  -Strep Pneumonia vaccines: s/p Prevnar 20 vaccine  -Shingles vaccine: in summer 2024. 1 dose, then another dose 3 months later.  -Get TDAP every 10 years  -get hepatitis B vaccine series      Rtc 3 months    A total of 45 minutes were spent face-to-face with the patient during this encounter and over half of that time was spent on counseling and coordination of care.        Diagnoses and all orders for this visit:    Systemic lupus erythematosus, unspecified SLE type, unspecified organ involvement status (HCC)  -     hydroxychloroquine 200 MG Oral Tab; Take 1 tablet (200 mg total) by mouth daily.  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      The above plan of care, diagnosis, orders, and follow-up were discussed with the patient. Questions related to this recommended plan of care were answered.    Thank you for referring this delightful patient to me. Please feel free to contact me with any questions.     This report was performed utilizing speech recognition software technology. Despite proofreading, speech recognition errors could escape detection. If a word or phrase is confusing or out of context, please do not  hesitate to call for   clarification.       Kind regards      Edis Guajardo MD  [facility]

## 2024-05-16 ENCOUNTER — TELEPHONE (OUTPATIENT)
Dept: RHEUMATOLOGY | Facility: CLINIC | Age: 54
End: 2024-05-16

## 2024-05-17 ENCOUNTER — NURSE ONLY (OUTPATIENT)
Dept: FAMILY MEDICINE CLINIC | Facility: CLINIC | Age: 54
End: 2024-05-17

## 2024-05-17 PROCEDURE — 90750 HZV VACC RECOMBINANT IM: CPT | Performed by: FAMILY MEDICINE

## 2024-05-17 PROCEDURE — 90471 IMMUNIZATION ADMIN: CPT | Performed by: FAMILY MEDICINE

## 2024-05-20 NOTE — TELEPHONE ENCOUNTER
Dr. Guajardo,    Patient is seeking intermittent Family Medical Leave Act for her lupus.    She is requesting 1-3 flare ups a month, each episode lasting 1-2 days.    Do you support?    Thank you,    Daniela BOOTH

## 2024-05-20 NOTE — TELEPHONE ENCOUNTER
Family Medical Leave Act forms received. [employer] message sent for Authorization. Logged for processing,

## 2024-05-20 NOTE — TELEPHONE ENCOUNTER
Type of Leave:  Intermittent Family Medical Leave Act   Reason for Leave:  Lupus  Start date of leave:  6/1/24  How much time needed?:  1-3 flare ups a month, each episode lasting 1-2 days      Forms Due Date:  Not given    Was Fee and Turnaround info Given?:  Yes, 5-7 business day turn around time

## 2024-06-07 ENCOUNTER — NURSE ONLY (OUTPATIENT)
Dept: FAMILY MEDICINE CLINIC | Facility: CLINIC | Age: 54
End: 2024-06-07
Payer: COMMERCIAL

## 2024-06-07 PROCEDURE — 90471 IMMUNIZATION ADMIN: CPT | Performed by: FAMILY MEDICINE

## 2024-06-07 PROCEDURE — 90746 HEPB VACCINE 3 DOSE ADULT IM: CPT | Performed by: FAMILY MEDICINE

## 2024-08-05 ENCOUNTER — LABORATORY ENCOUNTER (OUTPATIENT)
Dept: LAB | Age: 54
End: 2024-08-05
Attending: INTERNAL MEDICINE
Payer: COMMERCIAL

## 2024-08-05 DIAGNOSIS — M32.9 SYSTEMIC LUPUS ERYTHEMATOSUS, UNSPECIFIED SLE TYPE, UNSPECIFIED ORGAN INVOLVEMENT STATUS (HCC): ICD-10-CM

## 2024-08-05 LAB
ALBUMIN SERPL-MCNC: 4.3 G/DL (ref 3.2–4.8)
ALBUMIN/GLOB SERPL: 1.2 {RATIO} (ref 1–2)
ALP LIVER SERPL-CCNC: 113 U/L
ALT SERPL-CCNC: 14 U/L
ANION GAP SERPL CALC-SCNC: 4 MMOL/L (ref 0–18)
AST SERPL-CCNC: 26 U/L (ref ?–34)
BASOPHILS # BLD AUTO: 0.01 X10(3) UL (ref 0–0.2)
BASOPHILS NFR BLD AUTO: 0.3 %
BILIRUB SERPL-MCNC: 0.4 MG/DL (ref 0.3–1.2)
BILIRUB UR QL STRIP.AUTO: NEGATIVE
BUN BLD-MCNC: 7 MG/DL (ref 9–23)
C3 SERPL-MCNC: 85.2 MG/DL (ref 90–170)
C4 SERPL-MCNC: 21.6 MG/DL (ref 12–36)
CALCIUM BLD-MCNC: 9.8 MG/DL (ref 8.7–10.4)
CHLORIDE SERPL-SCNC: 106 MMOL/L (ref 98–112)
CLARITY UR REFRACT.AUTO: CLEAR
CO2 SERPL-SCNC: 30 MMOL/L (ref 21–32)
COLOR UR AUTO: COLORLESS
CREAT BLD-MCNC: 0.7 MG/DL
CREAT UR-SCNC: <13 MG/DL
CRP SERPL-MCNC: <0.4 MG/DL (ref ?–0.5)
EGFRCR SERPLBLD CKD-EPI 2021: 101 ML/MIN/1.73M2 (ref 60–?)
EOSINOPHIL # BLD AUTO: 0.06 X10(3) UL (ref 0–0.7)
EOSINOPHIL NFR BLD AUTO: 1.7 %
ERYTHROCYTE [DISTWIDTH] IN BLOOD BY AUTOMATED COUNT: 13.2 %
ERYTHROCYTE [SEDIMENTATION RATE] IN BLOOD: 61 MM/HR
FASTING STATUS PATIENT QL REPORTED: NO
GLOBULIN PLAS-MCNC: 3.5 G/DL (ref 2–3.5)
GLUCOSE BLD-MCNC: 61 MG/DL (ref 70–99)
GLUCOSE UR STRIP.AUTO-MCNC: NORMAL MG/DL
HCT VFR BLD AUTO: 39.2 %
HGB BLD-MCNC: 12.5 G/DL
IMM GRANULOCYTES # BLD AUTO: 0.01 X10(3) UL (ref 0–1)
IMM GRANULOCYTES NFR BLD: 0.3 %
KETONES UR STRIP.AUTO-MCNC: NEGATIVE MG/DL
LEUKOCYTE ESTERASE UR QL STRIP.AUTO: NEGATIVE
LYMPHOCYTES # BLD AUTO: 1.29 X10(3) UL (ref 1–4)
LYMPHOCYTES NFR BLD AUTO: 37.6 %
MCH RBC QN AUTO: 27.7 PG (ref 26–34)
MCHC RBC AUTO-ENTMCNC: 31.9 G/DL (ref 31–37)
MCV RBC AUTO: 86.7 FL
MONOCYTES # BLD AUTO: 0.23 X10(3) UL (ref 0.1–1)
MONOCYTES NFR BLD AUTO: 6.7 %
NEUTROPHILS # BLD AUTO: 1.83 X10 (3) UL (ref 1.5–7.7)
NEUTROPHILS # BLD AUTO: 1.83 X10(3) UL (ref 1.5–7.7)
NEUTROPHILS NFR BLD AUTO: 53.4 %
NITRITE UR QL STRIP.AUTO: NEGATIVE
OSMOLALITY SERPL CALC.SUM OF ELEC: 286 MOSM/KG (ref 275–295)
PH UR STRIP.AUTO: 7.5 [PH] (ref 5–8)
PLATELET # BLD AUTO: 184 10(3)UL (ref 150–450)
PLATELETS.RETICULATED NFR BLD AUTO: 2.4 % (ref 0–7)
POTASSIUM SERPL-SCNC: 4.1 MMOL/L (ref 3.5–5.1)
PROT SERPL-MCNC: 7.8 G/DL (ref 5.7–8.2)
PROT UR STRIP.AUTO-MCNC: NEGATIVE MG/DL
PROT UR-MCNC: <6 MG/DL (ref ?–14)
RBC # BLD AUTO: 4.52 X10(6)UL
RBC UR QL AUTO: NEGATIVE
SODIUM SERPL-SCNC: 140 MMOL/L (ref 136–145)
SP GR UR STRIP.AUTO: <1.005 (ref 1–1.03)
UROBILINOGEN UR STRIP.AUTO-MCNC: NORMAL MG/DL
WBC # BLD AUTO: 3.4 X10(3) UL (ref 4–11)

## 2024-08-05 PROCEDURE — 86225 DNA ANTIBODY NATIVE: CPT | Performed by: INTERNAL MEDICINE

## 2024-08-05 PROCEDURE — 82570 ASSAY OF URINE CREATININE: CPT | Performed by: INTERNAL MEDICINE

## 2024-08-05 PROCEDURE — 85025 COMPLETE CBC W/AUTO DIFF WBC: CPT | Performed by: INTERNAL MEDICINE

## 2024-08-05 PROCEDURE — 86140 C-REACTIVE PROTEIN: CPT | Performed by: INTERNAL MEDICINE

## 2024-08-05 PROCEDURE — 84156 ASSAY OF PROTEIN URINE: CPT | Performed by: INTERNAL MEDICINE

## 2024-08-05 PROCEDURE — 86160 COMPLEMENT ANTIGEN: CPT | Performed by: INTERNAL MEDICINE

## 2024-08-05 PROCEDURE — 80053 COMPREHEN METABOLIC PANEL: CPT | Performed by: INTERNAL MEDICINE

## 2024-08-05 PROCEDURE — 85652 RBC SED RATE AUTOMATED: CPT | Performed by: INTERNAL MEDICINE

## 2024-08-05 PROCEDURE — 81003 URINALYSIS AUTO W/O SCOPE: CPT | Performed by: INTERNAL MEDICINE

## 2024-08-06 LAB — DSDNA IGG SERPL IA-ACNC: 129 IU/ML

## 2024-08-07 LAB — DSDNA AB TITR SER: 160 {TITER}

## 2024-08-09 ENCOUNTER — OFFICE VISIT (OUTPATIENT)
Dept: RHEUMATOLOGY | Facility: CLINIC | Age: 54
End: 2024-08-09
Payer: COMMERCIAL

## 2024-08-09 VITALS
OXYGEN SATURATION: 97 % | SYSTOLIC BLOOD PRESSURE: 110 MMHG | TEMPERATURE: 97 F | RESPIRATION RATE: 16 BRPM | HEART RATE: 78 BPM | WEIGHT: 103.81 LBS | DIASTOLIC BLOOD PRESSURE: 52 MMHG | BODY MASS INDEX: 20.38 KG/M2 | HEIGHT: 60 IN

## 2024-08-09 DIAGNOSIS — Z51.81 THERAPEUTIC DRUG MONITORING: ICD-10-CM

## 2024-08-09 DIAGNOSIS — Z11.1 SCREENING FOR TUBERCULOSIS: ICD-10-CM

## 2024-08-09 DIAGNOSIS — Z11.3 SCREENING EXAMINATION FOR STD (SEXUALLY TRANSMITTED DISEASE): ICD-10-CM

## 2024-08-09 DIAGNOSIS — Z79.899 IMMUNODEFICIENCY DUE TO DRUG THERAPY (HCC): ICD-10-CM

## 2024-08-09 DIAGNOSIS — M32.9 SYSTEMIC LUPUS ERYTHEMATOSUS, UNSPECIFIED SLE TYPE, UNSPECIFIED ORGAN INVOLVEMENT STATUS (HCC): Primary | ICD-10-CM

## 2024-08-09 DIAGNOSIS — H20.9 ANTERIOR UVEITIS: ICD-10-CM

## 2024-08-09 DIAGNOSIS — D84.821 IMMUNODEFICIENCY DUE TO DRUG THERAPY (HCC): ICD-10-CM

## 2024-08-09 PROCEDURE — 3074F SYST BP LT 130 MM HG: CPT | Performed by: INTERNAL MEDICINE

## 2024-08-09 PROCEDURE — 99214 OFFICE O/P EST MOD 30 MIN: CPT | Performed by: INTERNAL MEDICINE

## 2024-08-09 PROCEDURE — 3008F BODY MASS INDEX DOCD: CPT | Performed by: INTERNAL MEDICINE

## 2024-08-09 PROCEDURE — G2211 COMPLEX E/M VISIT ADD ON: HCPCS | Performed by: INTERNAL MEDICINE

## 2024-08-09 PROCEDURE — 3078F DIAST BP <80 MM HG: CPT | Performed by: INTERNAL MEDICINE

## 2024-08-09 NOTE — PROGRESS NOTES
Rheumatology f/u Patient Note  =====================================================================================================  ?  Chief Complaint:   SLE    Chief Complaint   Patient presents with    Follow - Up     LOV 5/7/2024 patient states she feels better. She does have some pain in her neck and back of her head. Rapid 3 score is a 2.7.     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 1st episode given steroids and abx, this helped. 2nd episode: given abx only, which didn't help. Had tinnitus as well. Eventually, given prednisone 20 mg daily x 10 days and aleve by ENT. No antecedent supplements or drugs.  Sometimes takes aleve prn for pain/flaring of ears. Saw Dr. Sales (ENT at [place]), diagnosed with RPC, Audio: Bilateral mild high frequency SNHL, normal tympanograms  Also with stiffness in the hands a few months later, also with intermittent rashes in back. Photosensitive fatigue that is new. +Dry eye, feels dry and itching recently; saw ophtho ([facility], Dr. Ye) recently; no issues in eyes.   Feels more mucous in the airway and irritation in the airway recently.   Currently ear is 75% better from peak of inflammation. Has echocardiogram ordered by PCP office. Not completed yet.   2 kids:   +miscarriages: 2x. 10 or 12 weeks.   Denies current alopecia, malar rash, discoid lesions, oral/nasal ulcers, pleuritic chest pain, arthritis, seizures/psychosis, Raynaud's, dry mouth,or blood clots.  ==============================================================================================================  Visit: 05/07/24  Diffuse hair thinning. Doing well overall. Intermittent arthralgia and  swelling of the finger(s) starting few weeks ago.  Tapered off prednisone completely in March 2024.  No mouth sores.  Feeling well overall.  Very motivated to get off all medications  Medications/therapies:  Hydroxychloroquine (plaquenil) 200 mg daily  Off prednisone since March   Cellcept 500 mg twice daily  ==============================================================================================================  Today's Visit: 08/09/24    Despite lengthy discussion at last visit, patient self discontinued CellCept in June 2024.  Feels better being off the CellCept.  Diagnosed with OD uveitis in 7/31/2024.  Seen at [facility].  Use Pred forte, but only used twice before she felt unwell.  She stopped the medication.  Still has a bit of conjunctival injection but feeling better.  -Hair is regrowing.  Denies any mouth sores.  Denies any weakness.  Denies any chondritis  -Still off prednisone since March.    Medications/therapies:  Hydroxychloroquine 200 mg daily      5 point ROS negative except noted above  I had reviewed past medical and family histories together with allergy and medication lists documented.    Medications:  Outpatient Medications Marked as Taking for the 8/9/24 encounter (Office Visit) with Edis Guajardo MD   Medication Sig Dispense Refill    prednisoLONE 1 % Ophthalmic Suspension 1 drop 4 (four) times daily.      hydroxychloroquine 200 MG Oral Tab Take 1 tablet (200 mg total) by mouth daily. 90 tablet 1     Modified Medications    No medications on file     Medications Discontinued During This Encounter   Medication Reason    Mycophenolate Mofetil 500 MG Oral Tab        ?  Allergies:  Allergies   Allergen Reactions    Acyclovir HIVES and PAIN    Amoxicillin HIVES    Pantoprazole DIARRHEA and HIVES       Objective    Vitals:    08/09/24 1403   BP: 110/52   Pulse: 78   Resp: 16   Temp: 97.3 °F (36.3 °C)   SpO2: 97%   Weight: 103 lb 12.8 oz (47.1 kg)   Height: 5' (1.524 m)     GEN: NAD,  well-nourished.   HEENT: Head: NCAT. Face: No lesions. Eyes: Conjunctiva clear. Sclera are anicteric. PERRLA. EOMs are full.   -Right conjunctival injection that is mild/moderate, no painful extraocular movements.  CV: RRR, no mrg, S1/S2  PULM:  CTAB, easy effort  Extremities: No cyanosis, edema or deformities.   Neurologic: Strength, CN2-12 grossly intact   Psych: normal affect.   Skin: No lesions or rashes.  MSK: 28 joint count performed. No evidence of synovitis in mcp, pip, dip, wrist, elbows, shoulders, hips, knees, ankles, mtp unless otherwise noted. Full ROM of elbows, wrists, knees.     PtGA: 5  SJ: 0  TJ: 0  MDGA: 0      Labs:    Lab Results   Component Value Date    ESRML 61 (H) 08/05/2024    ESRML 83 (H) 05/01/2024    ESRML 68 (H) 02/03/2024    ESRML 106 (H) 12/26/2023    ESRML 63 (H) 12/01/2023    ESRML 39 (H) 11/01/2021    ESRML 31 (H) 06/25/2021    CRP <0.40 08/05/2024    CRP 0.92 (H) 05/01/2024    CRP <0.29 02/03/2024    CRP 8.60 (H) 12/26/2023    CRP 1.16 (H) 12/01/2023    CRP <0.29 11/01/2021    CRP <0.29 06/25/2021        Lab Results   Component Value Date     02/03/2024    CK 41 12/26/2023     12/11/2023     (H) 12/06/2023     (H) 12/05/2023     (H) 12/01/2023       Lab Results   Component Value Date    AST 26 08/05/2024    AST 22 05/01/2024    AST 23 03/25/2024    AST 28 02/03/2024    AST 25 01/11/2024    AST 39 (H) 12/26/2023     (H) 12/13/2023     (H) 12/11/2023     (H) 12/06/2023    AST 1,057 (H) 12/05/2023    ALT 14 08/05/2024    ALT 19 05/01/2024    ALT 23 03/25/2024    ALT 18 02/03/2024    ALT 25 01/11/2024    ALT 47 12/26/2023     (H) 12/13/2023     (H) 12/11/2023     (H) 12/06/2023     (H) 12/05/2023     Lab Results   Component Value Date    C3 85.2 (L) 08/05/2024    C3 97.6 05/01/2024    C3 72.0 (L) 02/03/2024    C3 80.3 (L) 12/26/2023    C3 40.4 (L) 12/06/2023    C3 27.9 (L) 12/01/2023    C3 71.4 (L)  11/01/2021    C4 21.6 08/05/2024    C4 26.3 05/01/2024    C4 17.2 02/03/2024    C4 19.1 12/26/2023    C4 7.5 (L) 12/06/2023    C4 6.2 (L) 12/01/2023    C4 19.0 11/01/2021         Lab Results   Component Value Date    WBC 3.4 (L) 08/05/2024    RBC 4.52 08/05/2024    HGB 12.5 08/05/2024    HCT 39.2 08/05/2024    .0 08/05/2024    MCV 86.7 08/05/2024    MCH 27.7 08/05/2024    MCHC 31.9 08/05/2024    RDW 13.2 08/05/2024    NEPRELIM 1.83 08/05/2024    NEPERCENT 53.4 08/05/2024    LYPERCENT 37.6 08/05/2024    MOPERCENT 6.7 08/05/2024    EOPERCENT 1.7 08/05/2024    BAPERCENT 0.3 08/05/2024    NE 1.83 08/05/2024    LYMABS 1.29 08/05/2024    MOABSO 0.23 08/05/2024    EOABSO 0.06 08/05/2024    BAABSO 0.01 08/05/2024     Lab Results   Component Value Date    GLU 61 (L) 08/05/2024    BUN 7 (L) 08/05/2024    BUNCREA 15.2 06/25/2021    CREATSERUM 0.70 08/05/2024    ANIONGAP 4 08/05/2024    GFRNAA 103 11/01/2021    GFRAA 118 11/01/2021    CA 9.8 08/05/2024    OSMOCALC 286 08/05/2024    ALKPHO 113 08/05/2024    AST 26 08/05/2024    ALT 14 08/05/2024    BILT 0.4 08/05/2024    TP 7.8 08/05/2024    ALB 4.3 08/05/2024    GLOBULIN 3.5 08/05/2024     08/05/2024    K 4.1 08/05/2024     08/05/2024    CO2 30.0 08/05/2024     Lab Results   Component Value Date    MESFIN 103.1 02/03/2024    MESFIN 368.0 (H) 01/11/2024    MESFIN 1,404.9 (H) 12/11/2023    MESFIN 2,574.4 (H) 12/05/2023    MESIFN 3,385.8 (H) 12/01/2023 11/2023  Creatinine 0.50, rest of BMP WNL  WBC 1.5, hemoglobin 11.0  U/a WNL    10/2022  UPCR less than 4/14.02  WBC 2.16, rest of CBC WNL  dsDNA greater than 300, SSA 7.1, chromatin 8.0  CK WNL  PEDRITO 1: 1280 homogenous  C3 59, C4 15    Lab Results   Component Value Date    ANAS Positive (A) 06/25/2021     Lab Results   Component Value Date     (H) 06/25/2021     (H) 06/25/2021     Lab Results   Component Value Date    DSDNA 160 (A) 08/05/2024    SMUD <100 06/25/2021    RNP <100 06/25/2021     Lab Results    Component Value Date    SCL70 <100 06/25/2021     Lab Results   Component Value Date    C3 85.2 (L) 08/05/2024    C4 21.6 08/05/2024     Lab Results   Component Value Date    DRVVT Negative 11/01/2021    [id number] 1.2 11/01/2021    [id number] <2.9 11/01/2021     Lab Results   Component Value Date    CARDIOLIPIGG 4.2 11/01/2021    CARDIOLIPIGM 4.1 11/01/2021 11/2021  C3 71.4, C4 19  CRP less than 0.29  Sed rate 39   lupus anticoagulant, anticardiolipin IgG/IgM, beta-2 glycoprotein IgG/IgM negative  RF negative  MPO 45    2021 labs  PEDRITO positive    SSB: 112  dsDNA 394  Histone 272  WBC 2.8, hemoglobin 12.2, platelets 200  Vitamin D 34.2    Radiology:    12/2023 TTE normal    10/2023 DEXA  LUMBAR SPINE ANALYSIS RESULTS:      Bone mineral density (BMD) (g/cm2):  41.71    Lumbar T-Score:  0.860      % young normals:  82      % age matched controls:  93      Change from prior spine examination:  n/a               TOTAL HIP ANALYSIS RESULTS:        Bone mineral density (BMD) (g/cm2):  0.599      Total Hip T-Score:  -1.2      % young normals:  82      % age matched controls:  113      Change from prior hip examination:  n/a               FEMORAL NECK ANALYSIS RESULTS:        Bone mineral density (BMD) (g/cm2):  0.736      Femoral neck T-Score:  -1.0      % young normals:  87      % age matched controls:  101      Change from prior hip examination:  n/a            ADDITIONAL FINDINGS:  No significant additional findings.  Ten year fracture risk for major osteoporotic fracture is 2.8% and for hip fracture 0.1%.     4/2021 MRI TMJ  IMPRESSION:   1.  Grossly normal left TMJ.   2.  Mild to moderate right TMJ chronic degenerative changes, with diminutive/chronically   degenerated/frayed disc that exhibits anterior displacement with probable PARTIAL reduction.   Associated stretched and/or partially torn retrodiscal soft tissues.   Please see above for details and additional information.    4/2021 MRI  IAC  IMPRESSION:   Essentially normal contrast-enhanced MRI of the IACs.   Radiology review:      =====================================================================================================  Assessment and Plan    Assessment:  1. Systemic lupus erythematosus, unspecified SLE type, unspecified organ involvement status (HCC)    2. Therapeutic drug monitoring    3. Screening for tuberculosis    4. Screening examination for STD (sexually transmitted disease)    5. Anterior uveitis    6. 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   -Clinical manifestations: Ear chondritis, arthralgia/synovitis, dry eye, photosensitive fatigue, rash, oral ulcers, miscarriage x 2 (>10 weeks).   -Serologies/labs: Positive PEDRITO, + SSA, + SSB, + dsDNA, + histone, leukopenia/lymphopenia, low C3, +MPO (likely from SLE)  -Recent lupus flare in Nov 2023 with the following:  -Worsening oral ulcers, low-grade temperatures, fatigue,   -Pancytopenia, particularly neutropenia and thrombocytopenia noted.    -Mixed hepatocellular/cholestatic injury with significant elevation in AST  -Elevated CK and myopathy, myalgia/myopathy pointing to myositis.     -Overall has appeared to have achieved a low disease activity/remission with combination low-dose CellCept/hydroxychloroquine along with prednisone taper.  Patient preferences and to a degree medication intolerance has stymied optimization of CellCept dosing.  -Patient self discontinued CellCept in June 2024.  Overall clinically and biochemically, patient is in the low disease activity.  Anterior uveitis in lupus is not common, hard to know if the 2 are associated in this patient.      #Right anterior uveitis:   -First episode, diagnosed late July 2024  -Did not tolerate Pred forte  -Still active but improving per patient report.     The following in italics were not discussed today:  #Recurrent chondritis. she has had 2 episodes (in October 2020 and in February 2021)  that is mostly resolved with corticosteroid therapy. Has seen Dr. Sales at [facility]. Some response to NSAIDs.  Audiogram with mild high-frequency SNHL. MRI IAC wnl. Previously seen by ophthalmology ([facility], Dr. Ye); no evidence of scleritis/keratitis/uveitis.  -HRCT in 11/2021 without tracheitis or ILD  # Positive CMV IgM: Negative CMV PCR.  Thought to be a false positive per ID.  Now stopped valganciclovir.    Plan:  -Again recommended to restart CellCept.  I generally would recommend at least 1 year of optimal CellCept therapy after achieving prednisone free remission due to the severe lupus flare as she has suffered in November 2023.    I again reiterated to her that she had a moderate/severe SLE flare with moderate neutropenia and very high AST/ALT indicating lupoid hepatitis, high CK indicating lupus myositis.  -Patient prefers to stay off CellCept.  Discussed restarting CellCept may help with uveitis.  Patient still declines.    Repeat SLE blood work every 2 months given the patient elected to stop medication.  -Add on uveitis specific lab testing including RPR, QuantiFERON gold, HLA-B27    Recommend increasing hydroxychloroquine briefly to 400 mg daily given active inflammatory arthritis being off prednisone.  Patient declines.  She will continue hydroxychloroquine 200 mg daily for now.    -Recommend obtaining another opinion with a lupus expert.  Dr. Barry or Dr. Wilson in [facility].  [facility] is having some difficulty receiving records that we have sent twice.  Patient also mailed the records to them as well.  Will try this again.      Vaccinations:  -Strep Pneumonia vaccines: s/p Prevnar 20 vaccine  -Shingles vaccine: in summer 2024. 1 dose, then another dose 3 months later.  -Get TDAP every 10 years  -get hepatitis B vaccine series    Rtc December 2024    Diagnoses and all orders for this visit:    Systemic lupus erythematosus, unspecified SLE type, unspecified organ involvement status  (HCC)  -     Comp Metabolic Panel (14); Future  -     Complement C3, Serum; Future  -     Complement C4, Serum; Future  -     CBC With Differential With Platelet; Future  -     Sed Rate, Westergren (Automated); Future  -     C-Reactive Protein; Future  -     dsDNA Antibody by IFA; Future  -     Anti-dsDNA Antibody, IgG; Future  -     CK (Creatine Kinase) (Not Creatinine); Future  -     Ferritin; Future  -     Comp Metabolic Panel (14); Future  -     Complement C3, Serum; Future  -     Complement C4, Serum; Future  -     CBC With Differential With Platelet; Future  -     Sed Rate, Westergren (Automated); Future  -     C-Reactive Protein; Future  -     Urinalysis with Culture Reflex; Future  -     Protein,Total,Urine, Random; Future  -     Creatinine, Urine, Random; Future  -     dsDNA Antibody by IFA; Future  -     Anti-dsDNA Antibody, IgG; Future    Therapeutic drug monitoring  -     Comp Metabolic Panel (14); Future  -     Complement C3, Serum; Future  -     Complement C4, Serum; Future  -     CBC With Differential With Platelet; Future  -     Sed Rate, Westergren (Automated); Future  -     C-Reactive Protein; Future  -     dsDNA Antibody by IFA; Future  -     Anti-dsDNA Antibody, IgG; Future  -     CK (Creatine Kinase) (Not Creatinine); Future  -     Ferritin; Future  -     Comp Metabolic Panel (14); Future  -     Complement C3, Serum; Future  -     Complement C4, Serum; Future  -     CBC With Differential With Platelet; Future  -     Sed Rate, Westergren (Automated); Future  -     C-Reactive Protein; Future  -     Urinalysis with Culture Reflex; Future  -     Protein,Total,Urine, Random; Future  -     Creatinine, Urine, Random; Future  -     dsDNA Antibody by IFA; Future  -     Anti-dsDNA Antibody, IgG; Future    Screening for tuberculosis  -     Quantiferon TB Plus; Future  -     HLA B 27 Disease Association; Future    Screening examination for STD (sexually transmitted disease)  -     T Pallidum Screening  [place]; Future    Anterior uveitis  -     HLA B 27 Disease Association; Future    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      The above plan of care, diagnosis, orders, and follow-up were discussed with the patient. Questions related to this recommended plan of care were answered.    Thank you for referring this delightful patient to me. Please feel free to contact me with any questions.     This report was performed utilizing speech recognition software technology. Despite proofreading, speech recognition errors could escape detection. If a word or phrase is confusing or out of context, please do not hesitate to call for   clarification.       Kind regards      Edis Guajardo MD  [facility]

## 2024-08-09 NOTE — PATIENT INSTRUCTIONS
Another option for steroid eye drop is durezol.     Uveitis:     Another lupus specialist is Dr. Micki Vásquez at [facility].

## 2024-08-16 ENCOUNTER — TELEPHONE (OUTPATIENT)
Dept: RHEUMATOLOGY | Facility: CLINIC | Age: 54
End: 2024-08-16

## 2024-10-04 ENCOUNTER — NURSE ONLY (OUTPATIENT)
Dept: FAMILY MEDICINE CLINIC | Facility: CLINIC | Age: 54
End: 2024-10-04
Payer: COMMERCIAL

## 2024-10-04 PROCEDURE — 90746 HEPB VACCINE 3 DOSE ADULT IM: CPT | Performed by: FAMILY MEDICINE

## 2024-10-04 PROCEDURE — 90471 IMMUNIZATION ADMIN: CPT | Performed by: FAMILY MEDICINE

## 2024-12-11 ENCOUNTER — LAB ENCOUNTER (OUTPATIENT)
Dept: LAB | Age: 54
End: 2024-12-11
Attending: INTERNAL MEDICINE
Payer: COMMERCIAL

## 2024-12-11 ENCOUNTER — TELEPHONE (OUTPATIENT)
Dept: RHEUMATOLOGY | Facility: CLINIC | Age: 54
End: 2024-12-11

## 2024-12-11 DIAGNOSIS — M32.9 SYSTEMIC LUPUS ERYTHEMATOSUS, UNSPECIFIED SLE TYPE, UNSPECIFIED ORGAN INVOLVEMENT STATUS (HCC): ICD-10-CM

## 2024-12-11 DIAGNOSIS — Z11.3 SCREENING EXAMINATION FOR STD (SEXUALLY TRANSMITTED DISEASE): ICD-10-CM

## 2024-12-11 DIAGNOSIS — R79.0 LOW FERRITIN: Primary | ICD-10-CM

## 2024-12-11 DIAGNOSIS — Z11.1 SCREENING FOR TUBERCULOSIS: ICD-10-CM

## 2024-12-11 DIAGNOSIS — H20.9 ANTERIOR UVEITIS: ICD-10-CM

## 2024-12-11 DIAGNOSIS — D50.9 IRON DEFICIENCY ANEMIA, UNSPECIFIED IRON DEFICIENCY ANEMIA TYPE: Primary | ICD-10-CM

## 2024-12-11 DIAGNOSIS — Z51.81 THERAPEUTIC DRUG MONITORING: ICD-10-CM

## 2024-12-11 DIAGNOSIS — D50.9 IRON DEFICIENCY ANEMIA, UNSPECIFIED IRON DEFICIENCY ANEMIA TYPE: ICD-10-CM

## 2024-12-11 LAB
ALBUMIN SERPL-MCNC: 4.1 G/DL (ref 3.2–4.8)
ALBUMIN/GLOB SERPL: 1.1 {RATIO} (ref 1–2)
ALP LIVER SERPL-CCNC: 117 U/L
ALT SERPL-CCNC: 15 U/L
ANION GAP SERPL CALC-SCNC: 6 MMOL/L (ref 0–18)
AST SERPL-CCNC: 30 U/L (ref ?–34)
BASOPHILS # BLD AUTO: 0 X10(3) UL (ref 0–0.2)
BASOPHILS NFR BLD AUTO: 0 %
BILIRUB SERPL-MCNC: 0.4 MG/DL (ref 0.3–1.2)
BILIRUB UR QL STRIP.AUTO: NEGATIVE
BUN BLD-MCNC: 8 MG/DL (ref 9–23)
C3 SERPL-MCNC: 77.8 MG/DL (ref 90–170)
C4 SERPL-MCNC: 20.3 MG/DL (ref 12–36)
CALCIUM BLD-MCNC: 9.6 MG/DL (ref 8.7–10.4)
CHLORIDE SERPL-SCNC: 107 MMOL/L (ref 98–112)
CK SERPL-CCNC: 71 U/L
CLARITY UR REFRACT.AUTO: CLEAR
CO2 SERPL-SCNC: 29 MMOL/L (ref 21–32)
COLOR UR AUTO: COLORLESS
CREAT BLD-MCNC: 0.76 MG/DL
CREAT UR-SCNC: 34.6 MG/DL
CRP SERPL-MCNC: <0.4 MG/DL (ref ?–0.5)
DEPRECATED HBV CORE AB SER IA-ACNC: 33 NG/ML
EGFRCR SERPLBLD CKD-EPI 2021: 92 ML/MIN/1.73M2 (ref 60–?)
EOSINOPHIL # BLD AUTO: 0.04 X10(3) UL (ref 0–0.7)
EOSINOPHIL NFR BLD AUTO: 1.2 %
ERYTHROCYTE [DISTWIDTH] IN BLOOD BY AUTOMATED COUNT: 13.2 %
ERYTHROCYTE [SEDIMENTATION RATE] IN BLOOD: 66 MM/HR
FASTING STATUS PATIENT QL REPORTED: NO
GLOBULIN PLAS-MCNC: 3.9 G/DL (ref 2–3.5)
GLUCOSE BLD-MCNC: 76 MG/DL (ref 70–99)
GLUCOSE UR STRIP.AUTO-MCNC: NORMAL MG/DL
HAPTOGLOB SERPL-MCNC: 116 MG/DL (ref 30–200)
HCT VFR BLD AUTO: 40.9 %
HGB BLD-MCNC: 13 G/DL
IMM GRANULOCYTES # BLD AUTO: 0.01 X10(3) UL (ref 0–1)
IMM GRANULOCYTES NFR BLD: 0.3 %
KETONES UR STRIP.AUTO-MCNC: NEGATIVE MG/DL
LEUKOCYTE ESTERASE UR QL STRIP.AUTO: NEGATIVE
LYMPHOCYTES # BLD AUTO: 1.15 X10(3) UL (ref 1–4)
LYMPHOCYTES NFR BLD AUTO: 33.9 %
MCH RBC QN AUTO: 28 PG (ref 26–34)
MCHC RBC AUTO-ENTMCNC: 31.8 G/DL (ref 31–37)
MCV RBC AUTO: 88 FL
MONOCYTES # BLD AUTO: 0.2 X10(3) UL (ref 0.1–1)
MONOCYTES NFR BLD AUTO: 5.9 %
NEUTROPHILS # BLD AUTO: 1.99 X10 (3) UL (ref 1.5–7.7)
NEUTROPHILS # BLD AUTO: 1.99 X10(3) UL (ref 1.5–7.7)
NEUTROPHILS NFR BLD AUTO: 58.7 %
NITRITE UR QL STRIP.AUTO: NEGATIVE
OSMOLALITY SERPL CALC.SUM OF ELEC: 291 MOSM/KG (ref 275–295)
PH UR STRIP.AUTO: 7 [PH] (ref 5–8)
PLATELET # BLD AUTO: 185 10(3)UL (ref 150–450)
POTASSIUM SERPL-SCNC: 4.1 MMOL/L (ref 3.5–5.1)
PROT SERPL-MCNC: 8 G/DL (ref 5.7–8.2)
PROT UR STRIP.AUTO-MCNC: NEGATIVE MG/DL
PROT UR-MCNC: <6 MG/DL (ref ?–14)
RBC # BLD AUTO: 4.65 X10(6)UL
RBC UR QL AUTO: NEGATIVE
SODIUM SERPL-SCNC: 142 MMOL/L (ref 136–145)
SP GR UR STRIP.AUTO: 1.01 (ref 1–1.03)
T PALLIDUM AB SER QL IA: NONREACTIVE
UROBILINOGEN UR STRIP.AUTO-MCNC: NORMAL MG/DL
WBC # BLD AUTO: 3.4 X10(3) UL (ref 4–11)

## 2024-12-11 PROCEDURE — 81374 HLA I TYPING 1 ANTIGEN LR: CPT | Performed by: INTERNAL MEDICINE

## 2024-12-11 PROCEDURE — 82728 ASSAY OF FERRITIN: CPT | Performed by: INTERNAL MEDICINE

## 2024-12-11 PROCEDURE — 85652 RBC SED RATE AUTOMATED: CPT | Performed by: INTERNAL MEDICINE

## 2024-12-11 PROCEDURE — 83010 ASSAY OF HAPTOGLOBIN QUANT: CPT | Performed by: INTERNAL MEDICINE

## 2024-12-11 PROCEDURE — 85025 COMPLETE CBC W/AUTO DIFF WBC: CPT | Performed by: INTERNAL MEDICINE

## 2024-12-11 PROCEDURE — 81003 URINALYSIS AUTO W/O SCOPE: CPT | Performed by: INTERNAL MEDICINE

## 2024-12-11 PROCEDURE — 86225 DNA ANTIBODY NATIVE: CPT | Performed by: INTERNAL MEDICINE

## 2024-12-11 PROCEDURE — 80053 COMPREHEN METABOLIC PANEL: CPT | Performed by: INTERNAL MEDICINE

## 2024-12-11 PROCEDURE — 86160 COMPLEMENT ANTIGEN: CPT | Performed by: INTERNAL MEDICINE

## 2024-12-11 PROCEDURE — 82550 ASSAY OF CK (CPK): CPT | Performed by: INTERNAL MEDICINE

## 2024-12-11 PROCEDURE — 82570 ASSAY OF URINE CREATININE: CPT | Performed by: INTERNAL MEDICINE

## 2024-12-11 PROCEDURE — 86140 C-REACTIVE PROTEIN: CPT | Performed by: INTERNAL MEDICINE

## 2024-12-11 PROCEDURE — 86480 TB TEST CELL IMMUN MEASURE: CPT | Performed by: INTERNAL MEDICINE

## 2024-12-11 PROCEDURE — 86780 TREPONEMA PALLIDUM: CPT | Performed by: INTERNAL MEDICINE

## 2024-12-11 PROCEDURE — 84156 ASSAY OF PROTEIN URINE: CPT | Performed by: INTERNAL MEDICINE

## 2024-12-11 RX ORDER — FERROUS SULFATE 325(65) MG
325 TABLET ORAL
Qty: 90 TABLET | Refills: 1 | Status: SHIPPED
Start: 2024-12-11

## 2024-12-11 RX ORDER — FERROUS SULFATE 325(65) MG
325 TABLET ORAL
Qty: 90 TABLET | Refills: 1 | Status: SHIPPED | OUTPATIENT
Start: 2024-12-11

## 2024-12-12 LAB
DSDNA AB TITR SER: 160 {TITER}
DSDNA IGG SERPL IA-ACNC: 190 IU/ML

## 2024-12-13 ENCOUNTER — OFFICE VISIT (OUTPATIENT)
Dept: RHEUMATOLOGY | Facility: CLINIC | Age: 54
End: 2024-12-13
Payer: COMMERCIAL

## 2024-12-13 VITALS
SYSTOLIC BLOOD PRESSURE: 98 MMHG | HEIGHT: 60 IN | HEART RATE: 73 BPM | RESPIRATION RATE: 16 BRPM | TEMPERATURE: 97 F | BODY MASS INDEX: 21.2 KG/M2 | DIASTOLIC BLOOD PRESSURE: 58 MMHG | WEIGHT: 108 LBS | OXYGEN SATURATION: 99 %

## 2024-12-13 DIAGNOSIS — M32.9 SYSTEMIC LUPUS ERYTHEMATOSUS, UNSPECIFIED SLE TYPE, UNSPECIFIED ORGAN INVOLVEMENT STATUS (HCC): Primary | ICD-10-CM

## 2024-12-13 DIAGNOSIS — D50.9 IRON DEFICIENCY ANEMIA, UNSPECIFIED IRON DEFICIENCY ANEMIA TYPE: ICD-10-CM

## 2024-12-13 DIAGNOSIS — Z51.81 THERAPEUTIC DRUG MONITORING: ICD-10-CM

## 2024-12-13 DIAGNOSIS — R79.0 LOW FERRITIN: ICD-10-CM

## 2024-12-13 LAB
M TB IFN-G CD4+ T-CELLS BLD-ACNC: 0.01 IU/ML
M TB TUBERC IFN-G BLD QL: NEGATIVE
M TB TUBERC IGNF/MITOGEN IGNF CONTROL: 1.85 IU/ML
QFT TB1 AG MINUS NIL: 0 IU/ML
QFT TB2 AG MINUS NIL: 0 IU/ML

## 2024-12-13 PROCEDURE — 99214 OFFICE O/P EST MOD 30 MIN: CPT | Performed by: INTERNAL MEDICINE

## 2024-12-13 PROCEDURE — 3008F BODY MASS INDEX DOCD: CPT | Performed by: INTERNAL MEDICINE

## 2024-12-13 PROCEDURE — G2211 COMPLEX E/M VISIT ADD ON: HCPCS | Performed by: INTERNAL MEDICINE

## 2024-12-13 PROCEDURE — 3074F SYST BP LT 130 MM HG: CPT | Performed by: INTERNAL MEDICINE

## 2024-12-13 PROCEDURE — 3078F DIAST BP <80 MM HG: CPT | Performed by: INTERNAL MEDICINE

## 2024-12-13 NOTE — PROGRESS NOTES
Rheumatology f/u Patient Note  =====================================================================================================  ?  Chief Complaint:   SLE    Chief Complaint   Patient presents with    Follow - Up     LOV: 8/9/24  Patient is here for a follow up visit. Patient states that she feels good. No flare ups of any kind.     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 1st episode given steroids and abx, this helped. 2nd episode: given abx only, which didn't help. Had tinnitus as well. Eventually, given prednisone 20 mg daily x 10 days and aleve by ENT. No antecedent supplements or drugs.  Sometimes takes aleve prn for pain/flaring of ears. Saw Dr. Sales (ENT at [facility]), diagnosed with RPC, Audio: Bilateral mild high frequency SNHL, normal tympanograms  Also with stiffness in the hands a few months later, also with intermittent rashes in back. Photosensitive fatigue that is new. +Dry eye, feels dry and itching recently; saw ophtho ([facility], Dr. Ye) recently; no issues in eyes.   Feels more mucous in the airway and irritation in the airway recently.   Currently ear is 75% better from peak of inflammation. Has echocardiogram ordered by PCP office. Not completed yet.   2 kids:   +miscarriages: 2x. 10 or 12 weeks.   Denies current alopecia, malar rash, discoid lesions, oral/nasal ulcers, pleuritic chest pain, arthritis, seizures/psychosis, Raynaud's, dry mouth,or blood clots.  ==============================================================================================================  Visit: 08/09/24  Despite lengthy discussion at last visit, patient self discontinued CellCept in June  2024.  Feels better being off the CellCept.  Diagnosed with OD uveitis in 7/31/2024.  Seen at [facility].  Use Pred forte, but only used twice before she felt unwell.  She stopped the medication.  Still has a bit of conjunctival injection but feeling better.  -Hair is regrowing.  Denies any mouth sores.  Denies any weakness.  Denies any chondritis  -Still off prednisone since March.  Medications/therapies:  Hydroxychloroquine 200 mg daily  ==============================================================================================================  Today's Visit: 12/13/24    1 week after the last visit, self discontinued hydroxychloroquine (plaquenil). Uveitis improved.   -went on lupus walk.  -feels good.     5 point ROS negative except noted above  I had reviewed past medical and family histories together with allergy and medication lists documented.      Medications:  No outpatient medications have been marked as taking for the 12/13/24 encounter (Office Visit) with Edis Guajardo MD.     Modified Medications    No medications on file     There are no discontinued medications.      ?  Allergies:  Allergies   Allergen Reactions    Acyclovir HIVES and PAIN    Amoxicillin HIVES    Pantoprazole DIARRHEA and HIVES       Objective    Vitals:    12/13/24 1358   BP: 98/58   Pulse: 73   Resp: 16   Temp: 96.8 °F (36 °C)   SpO2: 99%   Weight: 108 lb (49 kg)   Height: 5' (1.524 m)     GEN: NAD, well-nourished.   HEENT: Head: NCAT. Face: No lesions. Eyes: Conjunctiva clear. Sclera are anicteric. PERRLA. EOMs are full.   CV: RRR, no mrg, S1/S2  PULM:  CTAB, easy effort  Extremities: No cyanosis, edema or deformities.   Neurologic: Strength, CN2-12 grossly intact   Psych: normal affect.   Skin: No lesions or rashes.  MSK: 28 joint count performed. No evidence of synovitis in mcp, pip, dip, wrist, elbows, shoulders, hips, knees, ankles, mtp unless otherwise noted. Full ROM of elbows, wrists, knees.     PtGA: 5  SJ:  0  TJ: 0  MDGA: 0      Labs:    Lab Results   Component Value Date    ESRML 66 (H) 12/11/2024    ESRML 61 (H) 08/05/2024    ESRML 83 (H) 05/01/2024    ESRML 68 (H) 02/03/2024    ESRML 106 (H) 12/26/2023    ESRML 63 (H) 12/01/2023    ESRML 39 (H) 11/01/2021    ESRML 31 (H) 06/25/2021    CRP <0.40 12/11/2024    CRP <0.40 08/05/2024    CRP 0.92 (H) 05/01/2024    CRP <0.29 02/03/2024    CRP 8.60 (H) 12/26/2023    CRP 1.16 (H) 12/01/2023    CRP <0.29 11/01/2021    CRP <0.29 06/25/2021        Lab Results   Component Value Date    CK 71 12/11/2024     02/03/2024    CK 41 12/26/2023     12/11/2023     (H) 12/06/2023     (H) 12/05/2023     (H) 12/01/2023       Lab Results   Component Value Date    AST 30 12/11/2024    AST 26 08/05/2024    AST 22 05/01/2024    AST 23 03/25/2024    AST 28 02/03/2024    AST 25 01/11/2024    AST 39 (H) 12/26/2023     (H) 12/13/2023     (H) 12/11/2023     (H) 12/06/2023    ALT 15 12/11/2024    ALT 14 08/05/2024    ALT 19 05/01/2024    ALT 23 03/25/2024    ALT 18 02/03/2024    ALT 25 01/11/2024    ALT 47 12/26/2023     (H) 12/13/2023     (H) 12/11/2023     (H) 12/06/2023     Lab Results   Component Value Date    C3 77.8 (L) 12/11/2024    C3 85.2 (L) 08/05/2024    C3 97.6 05/01/2024    C3 72.0 (L) 02/03/2024    C3 80.3 (L) 12/26/2023    C3 40.4 (L) 12/06/2023    C3 27.9 (L) 12/01/2023    C3 71.4 (L) 11/01/2021    C4 20.3 12/11/2024    C4 21.6 08/05/2024    C4 26.3 05/01/2024    C4 17.2 02/03/2024    C4 19.1 12/26/2023    C4 7.5 (L) 12/06/2023    C4 6.2 (L) 12/01/2023    C4 19.0 11/01/2021         Lab Results   Component Value Date    WBC 3.4 (L) 12/11/2024    RBC 4.65 12/11/2024    HGB 13.0 12/11/2024    HCT 40.9 12/11/2024    .0 12/11/2024    MCV 88.0 12/11/2024    MCH 28.0 12/11/2024    MCHC 31.8 12/11/2024    RDW 13.2 12/11/2024    NEPRELIM 1.99 12/11/2024    NEPERCENT 58.7 12/11/2024    LYPERCENT 33.9 12/11/2024     MOPERCENT 5.9 12/11/2024    EOPERCENT 1.2 12/11/2024    BAPERCENT 0.0 12/11/2024    NE 1.99 12/11/2024    LYMABS 1.15 12/11/2024    MOABSO 0.20 12/11/2024    EOABSO 0.04 12/11/2024    BAABSO 0.00 12/11/2024     Lab Results   Component Value Date    GLU 76 12/11/2024    BUN 8 (L) 12/11/2024    BUNCREA 15.2 06/25/2021    CREATSERUM 0.76 12/11/2024    ANIONGAP 6 12/11/2024    GFRNAA 103 11/01/2021    GFRAA 118 11/01/2021    CA 9.6 12/11/2024    OSMOCALC 291 12/11/2024    ALKPHO 117 12/11/2024    AST 30 12/11/2024    ALT 15 12/11/2024    BILT 0.4 12/11/2024    TP 8.0 12/11/2024    ALB 4.1 12/11/2024    GLOBULIN 3.9 (H) 12/11/2024     12/11/2024    K 4.1 12/11/2024     12/11/2024    CO2 29.0 12/11/2024     Lab Results   Component Value Date    MESFIN 33 (L) 12/11/2024    MESFIN 103.1 02/03/2024    MESFIN 368.0 (H) 01/11/2024    MESFIN 1,404.9 (H) 12/11/2023    MESFIN 2,574.4 (H) 12/05/2023    MESFIN 3,385.8 (H) 12/01/2023 11/2023  Creatinine 0.50, rest of BMP WNL  WBC 1.5, hemoglobin 11.0  U/a WNL    10/2022  UPCR less than 4/14.02  WBC 2.16, rest of CBC WNL  dsDNA greater than 300, SSA 7.1, chromatin 8.0  CK WNL  PEDRITO 1: 1280 homogenous  C3 59, C4 15    Lab Results   Component Value Date    ANAS Positive (A) 06/25/2021     Lab Results   Component Value Date     (H) 06/25/2021     (H) 06/25/2021     Lab Results   Component Value Date    DSDNA 160 (A) 12/11/2024    SMUD <100 06/25/2021    RNP <100 06/25/2021     Lab Results   Component Value Date    SCL70 <100 06/25/2021     Lab Results   Component Value Date    C3 77.8 (L) 12/11/2024    C4 20.3 12/11/2024     Lab Results   Component Value Date    DRVVT Negative 11/01/2021    [id number] 1.2 11/01/2021    [id number] <2.9 11/01/2021     Lab Results   Component Value Date    CARDIOLIPIGG 4.2 11/01/2021    CARDIOLIPIGM 4.1 11/01/2021 11/2021  C3 71.4, C4 19  CRP less than 0.29  Sed rate 39   lupus anticoagulant, anticardiolipin IgG/IgM, beta-2  glycoprotein IgG/IgM negative  RF negative  MPO 45    2021 labs  PEDRITO positive    SSB: 112  dsDNA 394  Histone 272  WBC 2.8, hemoglobin 12.2, platelets 200  Vitamin D 34.2    Radiology:    12/2023 TTE normal    10/2023 DEXA  LUMBAR SPINE ANALYSIS RESULTS:      Bone mineral density (BMD) (g/cm2):  41.71    Lumbar T-Score:  0.860      % young normals:  82      % age matched controls:  93      Change from prior spine examination:  n/a               TOTAL HIP ANALYSIS RESULTS:        Bone mineral density (BMD) (g/cm2):  0.599      Total Hip T-Score:  -1.2      % young normals:  82      % age matched controls:  113      Change from prior hip examination:  n/a               FEMORAL NECK ANALYSIS RESULTS:        Bone mineral density (BMD) (g/cm2):  0.736      Femoral neck T-Score:  -1.0      % young normals:  87      % age matched controls:  101      Change from prior hip examination:  n/a            ADDITIONAL FINDINGS:  No significant additional findings.  Ten year fracture risk for major osteoporotic fracture is 2.8% and for hip fracture 0.1%.     4/2021 MRI TMJ  IMPRESSION:   1.  Grossly normal left TMJ.   2.  Mild to moderate right TMJ chronic degenerative changes, with diminutive/chronically   degenerated/frayed disc that exhibits anterior displacement with probable PARTIAL reduction.   Associated stretched and/or partially torn retrodiscal soft tissues.   Please see above for details and additional information.    4/2021 MRI IAC  IMPRESSION:   Essentially normal contrast-enhanced MRI of the IACs.   Radiology review:      =====================================================================================================  Assessment and Plan  Assessment:  1. Systemic lupus erythematosus, unspecified SLE type, unspecified organ involvement status (HCC)    2. Therapeutic drug monitoring    3. Low ferritin    4. Iron deficiency anemia, unspecified iron deficiency anemia type      #SLE: diagnosed 2021.   -Clinical  manifestations: Ear chondritis, arthralgia/synovitis, dry eye, photosensitive fatigue, rash, oral ulcers, miscarriage x 2 (>10 weeks).   -Serologies/labs: Positive PEDRITO, + SSA, + SSB, + dsDNA, + histone, leukopenia/lymphopenia, low C3, +MPO (likely from SLE)  -Recent lupus flare in Nov 2023 with the following:  -Worsening oral ulcers, low-grade temperatures, fatigue,   -Pancytopenia, particularly neutropenia and thrombocytopenia noted.    -Mixed hepatocellular/cholestatic injury with significant elevation in AST  -Elevated CK and myopathy, myalgia/myopathy pointing to myositis.     -Overall, achieved a low disease activity/remission with combination low-dose CellCept/hydroxychloroquine along with prednisone taper.  Patient preferences and to a degree medication intolerance has stymied optimization of CellCept dosing.  -Patient self discontinued CellCept in June 2024 and self discontinued hydroxychloroquine in August 2024.  Clinically and biochemically patient is in low disease activity. Low C3 noted.     #Right anterior uveitis:   -First episode, diagnosed late July 2024  -Did not tolerate Pred forte  -Resolved    The following in italics were not discussed today:  #Recurrent chondritis. she has had 2 episodes (in October 2020 and in February 2021) that is mostly resolved with corticosteroid therapy. Has seen Dr. Sales at [facility]. Some response to NSAIDs.  Audiogram with mild high-frequency SNHL. MRI IAC wnl. Previously seen by ophthalmology ([facility], Dr. Ye); no evidence of scleritis/keratitis/uveitis.  -HRCT in 11/2021 without tracheitis or ILD  # Positive CMV IgM: Negative CMV PCR.  Thought to be a false positive per ID.  Now stopped valganciclovir.    Plan:  -Patient adamantly refuses to restart any immunomodulatory therapy for SLE.  -I generally would recommend at least 1 year of optimal CellCept therapy after achieving prednisone free remission due to the severe lupus flare as she has suffered in  November 2023.    I again reiterated to her that she had a moderate/severe SLE flare with moderate neutropenia and very high AST/ALT indicating lupoid hepatitis, high CK indicating lupus myositis.  -Untreated SLE may portend organ failure and even death.  Patient understands.    Repeat CMP/CBC W differential every 3 months given the patient elected to stop medication.  -Repeat full SLE panel every 6 months    -Recommend obtaining another opinion with a lupus expert.  Dr. Barry or Dr. Wilson in [facility].  Patient offered appointment but did not schedule    Vaccinations:  -Strep Pneumonia vaccines: s/p Prevnar 20 vaccine  -Shingles vaccine: in summer 2024. 1 dose, then another dose 3 months later.  -Get TDAP every 10 years  -get hepatitis B vaccine series  -COVID/flu    RTC every 6 months    Diagnoses and all orders for this visit:    Systemic lupus erythematosus, unspecified SLE type, unspecified organ involvement status (HCC)  -     Comp Metabolic Panel (14); Future  -     CBC With Differential With Platelet; Future  -     Comp Metabolic Panel (14); Future  -     Complement C3, Serum; Future  -     Complement C4, Serum; Future  -     CBC With Differential With Platelet; Future  -     Sed Rate, Westergren (Automated); Future  -     C-Reactive Protein; Future  -     Urinalysis with Culture Reflex; Future  -     Protein,Total,Urine, Random; Future  -     Creatinine, Urine, Random; Future  -     dsDNA Antibody by IFA; Future  -     Anti-dsDNA Antibody, IgG; Future  -     TSH W Reflex To Free T4; Future  -     Vitamin D; Future  -     B12 AND FOLATE; Future  -     Iron And Tibc; Future  -     Ferritin; Future    Therapeutic drug monitoring  -     Comp Metabolic Panel (14); Future  -     CBC With Differential With Platelet; Future  -     Comp Metabolic Panel (14); Future  -     Complement C3, Serum; Future  -     Complement C4, Serum; Future  -     CBC With Differential With Platelet; Future  -     Sed Rate,  Westergren (Automated); Future  -     C-Reactive Protein; Future  -     Urinalysis with Culture Reflex; Future  -     Protein,Total,Urine, Random; Future  -     Creatinine, Urine, Random; Future  -     dsDNA Antibody by IFA; Future  -     Anti-dsDNA Antibody, IgG; Future  -     TSH W Reflex To Free T4; Future  -     Vitamin D; Future  -     B12 AND FOLATE; Future  -     Iron And Tibc; Future  -     Ferritin; Future    Low ferritin  -     Comp Metabolic Panel (14); Future  -     Complement C3, Serum; Future  -     Complement C4, Serum; Future  -     CBC With Differential With Platelet; Future  -     Sed Rate, Westergren (Automated); Future  -     C-Reactive Protein; Future  -     Urinalysis with Culture Reflex; Future  -     Protein,Total,Urine, Random; Future  -     Creatinine, Urine, Random; Future  -     dsDNA Antibody by IFA; Future  -     Anti-dsDNA Antibody, IgG; Future  -     TSH W Reflex To Free T4; Future  -     Vitamin D; Future  -     B12 AND FOLATE; Future  -     Iron And Tibc; Future  -     Ferritin; Future    Iron deficiency anemia, unspecified iron deficiency anemia type  -     Comp Metabolic Panel (14); Future  -     Complement C3, Serum; Future  -     Complement C4, Serum; Future  -     CBC With Differential With Platelet; Future  -     Sed Rate, Westergren (Automated); Future  -     C-Reactive Protein; Future  -     Urinalysis with Culture Reflex; Future  -     Protein,Total,Urine, Random; Future  -     Creatinine, Urine, Random; Future  -     dsDNA Antibody by IFA; Future  -     Anti-dsDNA Antibody, IgG; Future  -     TSH W Reflex To Free T4; Future  -     Vitamin D; Future  -     B12 AND FOLATE; Future  -     Iron And Tibc; Future  -     Ferritin; Future            Return in about 6 months (around 6/13/2025).      The above plan of care, diagnosis, orders, and follow-up were discussed with the patient. Questions related to this recommended plan of care were answered.    Thank you for referring  this delightful patient to me. Please feel free to contact me with any questions.     This report was performed utilizing speech recognition software technology. Despite proofreading, speech recognition errors could escape detection. If a word or phrase is confusing or out of context, please do not hesitate to call for   clarification.       Kind regards      Edis Guajardo MD  [facility]

## 2024-12-16 LAB
HLA B27 SCREENING: NEGATIVE
HLA B27 SCREENING: NEGATIVE

## 2025-01-06 ENCOUNTER — PATIENT MESSAGE (OUTPATIENT)
Dept: RHEUMATOLOGY | Facility: CLINIC | Age: 55
End: 2025-01-06

## 2025-06-15 ENCOUNTER — OFFICE VISIT (OUTPATIENT)
Dept: FAMILY MEDICINE CLINIC | Facility: CLINIC | Age: 55
End: 2025-06-15
Payer: COMMERCIAL

## 2025-06-15 VITALS
TEMPERATURE: 99 F | SYSTOLIC BLOOD PRESSURE: 102 MMHG | HEART RATE: 73 BPM | HEIGHT: 61 IN | OXYGEN SATURATION: 99 % | RESPIRATION RATE: 16 BRPM | WEIGHT: 111 LBS | DIASTOLIC BLOOD PRESSURE: 66 MMHG | BODY MASS INDEX: 20.96 KG/M2

## 2025-06-15 DIAGNOSIS — S70.362A TICK BITE OF LEFT THIGH, INITIAL ENCOUNTER: ICD-10-CM
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
## 2025-06-15 DIAGNOSIS — A69.20 LYME DISEASE WITH ERYTHEMA MIGRANS LESION 5 CM OR GREATER IN DIAMETER: Primary | ICD-10-CM

## 2025-06-15 PROCEDURE — 99213 OFFICE O/P EST LOW 20 MIN: CPT | Performed by: NURSE PRACTITIONER

## 2025-06-15 PROCEDURE — 3008F BODY MASS INDEX DOCD: CPT | Performed by: NURSE PRACTITIONER

## 2025-06-15 PROCEDURE — 3074F SYST BP LT 130 MM HG: CPT | Performed by: NURSE PRACTITIONER

## 2025-06-15 PROCEDURE — 3078F DIAST BP <80 MM HG: CPT | Performed by: NURSE PRACTITIONER

## 2025-06-15 RX ORDER — DOXYCYCLINE 100 MG/1
100 CAPSULE ORAL 2 TIMES DAILY
Qty: 42 CAPSULE | Refills: 0 | Status: SHIPPED | OUTPATIENT
Start: 2025-06-15 | End: 2025-07-06

## 2025-06-15 NOTE — PROGRESS NOTES
CHIEF COMPLAINT:     Chief Complaint   Patient presents with    Tick     X 1 week, suspected tick bite on L bite, felt something itchy and notcied a black dot on center of rash, OTC mosquito solution from [place]       HPI:     Arturo Ulloa is a 55 year old female who presents with concerns of deer tick bite 1 week ago, reports itching of skin under pants on lower left thigh,when area as observed, pt reports thin black \"spike\" embedded in left thigh,pt removed per self with fingernails and threw away. Reports waking the following morning with possible infected bite, area with erythematous target lesion  surrounding a dark center.Reports erythema,1+ edema noted of lesion and surrounding area,  increased warmth, some tenderness to palpation, and no drainage from area.  Symptoms have been the same since onset.  Precipitating event: visualization of black spike in center of bull's eye.  Treatments: cleansed area and anti-itch cream applied.  No other associated symptoms.  Denies fever, streaking of wound, or other signs of systemic illness.       Current Medications[1]   Past Medical History[2]   Social History:  Short Social Hx on File[3]     REVIEW OF SYSTEMS:   GENERAL: feels well otherwise, no fever, no chills.  SKIN: as above.  CHEST: no chest pains, no palpitations.  LUNGS: denies shortness of breath with exertion or rest. No wheezing, no cough.  LYMPH: no enlargement of the lymph nodes.  MUSC/SKEL: no joint swelling, no joint stiffness.  NEURO: no abnormal sensation, no tingling of the skin or numbness.    EXAM:   /66   Pulse 73   Temp 98.6 °F (37 °C)   Resp 16   Ht 5' 1\" (1.549 m)   Wt 111 lb (50.3 kg)   SpO2 99%   BMI 20.97 kg/m²   GENERAL: well developed, well nourished,in no apparent distress  SKIN: 45 mm height x 50 mm wide with cental dark area surrounded by erythematous ring with 1+ edema.  HEAD: atraumatic, normocephalic  EYES: conjunctiva clear, EOM intact  NOSE: Normal external nose.  No  rhinorrhea.  NECK: supple, non-tender  LUNGS: clear to auscultation bilaterally, no wheezes or rhonchi. Breathing is non labored.  CARDIO: RRR without murmur  EXTREMITIES: no cyanosis, clubbing or edema.  Cap refill brisk- less than 2 seconds.   LYMPH: no lymphadenopathy.      ASSESSMENT AND PLAN:   Pt bitten by unknown insect 7 days ago, removed black spike per self, woke the following day with mildly edematous area and \"bull's eye like lesion surrounding bite area. Pt denies seeing engorged tick, was out [place] when bitten, pt unaware that she was bitten or when.Was hiking with long pants on, spike removed that evening. Denies any flu-like symptoms or rash. Reports extreme itching. Pt was in [place] visiting spouse.Known endemic lyme infected deer ticks in that state.Will give script for Doxycycline 1 capsule 2 times per day for 21 days.Pt will call PCP tomorrow for further advice on taking antibiotics.   ASSESSMENT:  Encounter Diagnoses   Name Primary?    Tick bite of left thigh, initial encounter     Lyme disease with erythema migrans lesion 5 cm or greater in diameter Yes       PLAN:   Skin care discussed with patient.  Pt to contact PCP on Monday 6.16.2025.  Instructions and Comfort Care as listed in Patient Instructions.      Medication as below.    Requested Prescriptions     Signed Prescriptions Disp Refills    doxycycline 100 MG Oral Cap 42 capsule 0     Sig: Take 1 capsule (100 mg total) by mouth in the morning and 1 capsule (100 mg total) in the evening. Do all this for 21 days.         The patient indicates understanding of these issues and agrees to the plan.  The patient is asked to return in 3 days if sx's persist or worsen.           [1]   Current Outpatient Medications   Medication Sig Dispense Refill    doxycycline 100 MG Oral Cap Take 1 capsule (100 mg total) by mouth in the morning and 1 capsule (100 mg total) in the evening. Do all this for 21 days. 42 capsule 0    Ferrous Sulfate 325 (65  Fe) MG Oral Tab Take 1 tablet (325 mg total) by mouth daily with dinner. (Patient not taking: Reported on 6/15/2025) 90 tablet 1    Ferrous Sulfate 325 (65 Fe) MG Oral Tab Take 1 tablet (325 mg total) by mouth daily with dinner. (Patient not taking: Reported on 6/15/2025) 90 tablet 1    prednisoLONE 1 % Ophthalmic Suspension 1 drop 4 (four) times daily. (Patient not taking: Reported on 6/15/2025)      hydroxychloroquine 200 MG Oral Tab Take 1 tablet (200 mg total) by mouth daily. (Patient not taking: Reported on 6/15/2025) 90 tablet 1   [2]   Past Medical History:   Abdominal hernia    I have this for a long time    Anxiety    Since i fell sick    Arthritis    Bleeding nose    Bloating    Blurred vision    Change in hair    Fatigue    Fever    Flatulence/gas pain/belching    I started noticed I have more gas than usual around this time    Food intolerance    After I have the diarrhea n November, I started to have milk intolerance    Headache disorder    Hearing loss    History of rheumatic fever    Hoarseness, chronic    Leaking of urine    Loss of appetite    Mouth sores    Night sweats    Pain in joints    Relapsing polychondritis    Sleep disturbance    Spitting up blood    Systemic lupus erythematosus (HCC)    Uncomfortable fullness after meals    I started to lose appetite around this time    Wears glasses    Weight loss   [3]   Social History  Socioeconomic History    Marital status:    Tobacco Use    Smoking status: Never    Smokeless tobacco: Never   Substance and Sexual Activity    Alcohol use: Never    Drug use: Never   Other Topics Concern    Caffeine Concern No    Exercise No    Seat Belt No    Special Diet Yes     Comment: Mostly vegan    Stress Concern No    Weight Concern No

## 2025-06-16 ENCOUNTER — TELEPHONE (OUTPATIENT)
Dept: FAMILY MEDICINE CLINIC | Facility: CLINIC | Age: 55
End: 2025-06-16

## 2025-06-23 ENCOUNTER — TELEPHONE (OUTPATIENT)
Facility: CLINIC | Age: 55
End: 2025-06-23

## 2025-06-23 NOTE — TELEPHONE ENCOUNTER
Pt called the office wanting to reschedule 6/27/2025 appt. Would prefer [place] office as pt lives closer to that location; scheduled for first available on 9/4/2025. Pt then states she would like to keep Friday appt; advised to keep 9/4/25 appt incase pt changes her mind. Did advise to call by Wednesday in order to avoid cancellation fee. Pt voiced understanding with no further questions. Signing encounter.

## (undated) NOTE — Clinical Note
Referring:  No referring provider defined for this encounter. Dear Deshaun Brain: Thank you for referring your patient to me for an evaluation. Please see my attached note for my findings and recommendations.  Should you have any questions or co

## (undated) NOTE — Clinical Note
Please mail PT order to patient. She can start PT for myopathy. If she wants to go to [facility], she can let me know and I can put that referral in the system as well.